# Patient Record
Sex: FEMALE | Race: WHITE | HISPANIC OR LATINO | URBAN - METROPOLITAN AREA
[De-identification: names, ages, dates, MRNs, and addresses within clinical notes are randomized per-mention and may not be internally consistent; named-entity substitution may affect disease eponyms.]

---

## 2024-02-20 ENCOUNTER — LAB REQUISITION (OUTPATIENT)
Dept: LAB | Facility: HOSPITAL | Age: 29
End: 2024-02-20
Payer: COMMERCIAL

## 2024-02-20 DIAGNOSIS — Z34.90 ENCOUNTER FOR SUPERVISION OF NORMAL PREGNANCY, UNSPECIFIED, UNSPECIFIED TRIMESTER (HHS-HCC): ICD-10-CM

## 2024-02-20 PROCEDURE — 87086 URINE CULTURE/COLONY COUNT: CPT

## 2024-02-20 PROCEDURE — 87800 DETECT AGNT MULT DNA DIREC: CPT

## 2024-02-22 LAB — BACTERIA UR CULT: NO GROWTH

## 2024-02-23 LAB
C TRACH RRNA SPEC QL NAA+PROBE: NEGATIVE
N GONORRHOEA DNA SPEC QL PROBE+SIG AMP: NEGATIVE

## 2024-03-14 ENCOUNTER — LAB (OUTPATIENT)
Dept: LAB | Facility: LAB | Age: 29
End: 2024-03-14
Payer: COMMERCIAL

## 2024-03-14 DIAGNOSIS — Z34.90 ENCOUNTER FOR SUPERVISION OF NORMAL PREGNANCY, UNSPECIFIED, UNSPECIFIED TRIMESTER (HHS-HCC): Primary | ICD-10-CM

## 2024-03-14 LAB
ABO GROUP (TYPE) IN BLOOD: NORMAL
ANTIBODY SCREEN: NORMAL
ERYTHROCYTE [DISTWIDTH] IN BLOOD BY AUTOMATED COUNT: 13.1 % (ref 11.5–14.5)
HCT VFR BLD AUTO: 37.9 % (ref 36–46)
HGB BLD-MCNC: 13.3 G/DL (ref 12–16)
MCH RBC QN AUTO: 32 PG (ref 26–34)
MCHC RBC AUTO-ENTMCNC: 35.1 G/DL (ref 32–36)
MCV RBC AUTO: 91 FL (ref 80–100)
NRBC BLD-RTO: 0 /100 WBCS (ref 0–0)
PLATELET # BLD AUTO: 306 X10*3/UL (ref 150–450)
RBC # BLD AUTO: 4.16 X10*6/UL (ref 4–5.2)
RH FACTOR (ANTIGEN D): NORMAL
WBC # BLD AUTO: 8.7 X10*3/UL (ref 4.4–11.3)

## 2024-03-14 PROCEDURE — 87389 HIV-1 AG W/HIV-1&-2 AB AG IA: CPT

## 2024-03-14 PROCEDURE — 36415 COLL VENOUS BLD VENIPUNCTURE: CPT

## 2024-03-14 PROCEDURE — 86900 BLOOD TYPING SEROLOGIC ABO: CPT

## 2024-03-14 PROCEDURE — 86850 RBC ANTIBODY SCREEN: CPT

## 2024-03-14 PROCEDURE — 86803 HEPATITIS C AB TEST: CPT

## 2024-03-14 PROCEDURE — 87340 HEPATITIS B SURFACE AG IA: CPT

## 2024-03-14 PROCEDURE — 86317 IMMUNOASSAY INFECTIOUS AGENT: CPT

## 2024-03-14 PROCEDURE — 86780 TREPONEMA PALLIDUM: CPT

## 2024-03-14 PROCEDURE — 85027 COMPLETE CBC AUTOMATED: CPT

## 2024-03-14 PROCEDURE — 86695 HERPES SIMPLEX TYPE 1 TEST: CPT

## 2024-03-14 PROCEDURE — 86901 BLOOD TYPING SEROLOGIC RH(D): CPT

## 2024-03-14 PROCEDURE — 83036 HEMOGLOBIN GLYCOSYLATED A1C: CPT

## 2024-03-14 PROCEDURE — 86696 HERPES SIMPLEX TYPE 2 TEST: CPT

## 2024-03-15 LAB
EST. AVERAGE GLUCOSE BLD GHB EST-MCNC: 100 MG/DL
HBA1C MFR BLD: 5.1 %
HBV SURFACE AG SERPL QL IA: NONREACTIVE
HCV AB SER QL: NONREACTIVE
HERPES SIMPLEX VIRUS 1 IGG: <0.2 INDEX
HERPES SIMPLEX VIRUS 2 IGG: <0.2 INDEX
HIV 1+2 AB+HIV1 P24 AG SERPL QL IA: NONREACTIVE
REFLEX ADDED, ANEMIA PANEL: NORMAL
RUBV IGG SERPL IA-ACNC: 3.1 IA
RUBV IGG SERPL QL IA: POSITIVE
TREPONEMA PALLIDUM IGG+IGM AB [PRESENCE] IN SERUM OR PLASMA BY IMMUNOASSAY: NONREACTIVE

## 2024-05-07 ENCOUNTER — HOSPITAL ENCOUNTER (OUTPATIENT)
Dept: RADIOLOGY | Facility: CLINIC | Age: 29
Discharge: HOME | End: 2024-05-07
Payer: COMMERCIAL

## 2024-05-07 DIAGNOSIS — Z36.89 ENCOUNTER FOR FETAL ANATOMIC SURVEY (HHS-HCC): ICD-10-CM

## 2024-05-07 PROCEDURE — 76805 OB US >/= 14 WKS SNGL FETUS: CPT | Performed by: OBSTETRICS & GYNECOLOGY

## 2024-05-07 PROCEDURE — 76805 OB US >/= 14 WKS SNGL FETUS: CPT

## 2024-06-12 ENCOUNTER — LAB (OUTPATIENT)
Dept: LAB | Facility: LAB | Age: 29
End: 2024-06-12
Payer: COMMERCIAL

## 2024-06-12 DIAGNOSIS — Z34.90 ENCOUNTER FOR SUPERVISION OF NORMAL PREGNANCY, UNSPECIFIED, UNSPECIFIED TRIMESTER (HHS-HCC): Primary | ICD-10-CM

## 2024-06-12 LAB
ERYTHROCYTE [DISTWIDTH] IN BLOOD BY AUTOMATED COUNT: 12.9 % (ref 11.5–14.5)
GLUCOSE 1H P 50 G GLC PO SERPL-MCNC: 88 MG/DL
HCT VFR BLD AUTO: 35.4 % (ref 36–46)
HGB BLD-MCNC: 11.4 G/DL (ref 12–16)
MCH RBC QN AUTO: 30.6 PG (ref 26–34)
MCHC RBC AUTO-ENTMCNC: 32.2 G/DL (ref 32–36)
MCV RBC AUTO: 95 FL (ref 80–100)
NRBC BLD-RTO: 0 /100 WBCS (ref 0–0)
PLATELET # BLD AUTO: 278 X10*3/UL (ref 150–450)
RBC # BLD AUTO: 3.72 X10*6/UL (ref 4–5.2)
REFLEX ADDED, ANEMIA PANEL: NORMAL
WBC # BLD AUTO: 8.1 X10*3/UL (ref 4.4–11.3)

## 2024-06-12 PROCEDURE — 85027 COMPLETE CBC AUTOMATED: CPT

## 2024-06-12 PROCEDURE — 36415 COLL VENOUS BLD VENIPUNCTURE: CPT

## 2024-06-12 PROCEDURE — 82947 ASSAY GLUCOSE BLOOD QUANT: CPT

## 2024-07-29 ENCOUNTER — HOSPITAL ENCOUNTER (OUTPATIENT)
Dept: RADIOLOGY | Facility: CLINIC | Age: 29
Discharge: HOME | End: 2024-07-29
Payer: COMMERCIAL

## 2024-07-29 DIAGNOSIS — Z36.89 ENCOUNTER FOR FETAL ANATOMIC SURVEY (HHS-HCC): ICD-10-CM

## 2024-07-29 PROCEDURE — 76816 OB US FOLLOW-UP PER FETUS: CPT

## 2024-07-29 PROCEDURE — 76816 OB US FOLLOW-UP PER FETUS: CPT | Performed by: OBSTETRICS & GYNECOLOGY

## 2024-08-12 ENCOUNTER — HOSPITAL ENCOUNTER (OUTPATIENT)
Facility: HOSPITAL | Age: 29
Discharge: HOME | End: 2024-08-12
Attending: OBSTETRICS & GYNECOLOGY | Admitting: OBSTETRICS & GYNECOLOGY
Payer: COMMERCIAL

## 2024-08-12 VITALS
OXYGEN SATURATION: 100 % | RESPIRATION RATE: 16 BRPM | HEIGHT: 69 IN | BODY MASS INDEX: 30.81 KG/M2 | SYSTOLIC BLOOD PRESSURE: 126 MMHG | TEMPERATURE: 97.9 F | DIASTOLIC BLOOD PRESSURE: 76 MMHG | WEIGHT: 208 LBS | HEART RATE: 85 BPM

## 2024-08-12 DIAGNOSIS — O99.013 ANEMIA IN PREGNANCY, THIRD TRIMESTER (HHS-HCC): Primary | ICD-10-CM

## 2024-08-12 PROBLEM — Z3A.35 35 WEEKS GESTATION OF PREGNANCY (HHS-HCC): Status: ACTIVE | Noted: 2024-08-12

## 2024-08-12 LAB
ABO GROUP (TYPE) IN BLOOD: NORMAL
ALBUMIN SERPL BCP-MCNC: 3.6 G/DL (ref 3.4–5)
ALP SERPL-CCNC: 145 U/L (ref 33–110)
ALT SERPL W P-5'-P-CCNC: 8 U/L (ref 7–45)
ANION GAP SERPL CALC-SCNC: 14 MMOL/L (ref 10–20)
ANTIBODY SCREEN: NORMAL
AST SERPL W P-5'-P-CCNC: 14 U/L (ref 9–39)
BILIRUB SERPL-MCNC: 0.4 MG/DL (ref 0–1.2)
BUN SERPL-MCNC: 7 MG/DL (ref 6–23)
CALCIUM SERPL-MCNC: 8.5 MG/DL (ref 8.6–10.3)
CHLORIDE SERPL-SCNC: 105 MMOL/L (ref 98–107)
CO2 SERPL-SCNC: 20 MMOL/L (ref 21–32)
CREAT SERPL-MCNC: 0.57 MG/DL (ref 0.5–1.05)
CREAT UR-MCNC: 23.5 MG/DL (ref 20–320)
EGFRCR SERPLBLD CKD-EPI 2021: >90 ML/MIN/1.73M*2
ERYTHROCYTE [DISTWIDTH] IN BLOOD BY AUTOMATED COUNT: 13 % (ref 11.5–14.5)
GLUCOSE SERPL-MCNC: 73 MG/DL (ref 74–99)
HCT VFR BLD AUTO: 33.3 % (ref 36–46)
HGB BLD-MCNC: 10.9 G/DL (ref 12–16)
LDH SERPL L TO P-CCNC: 185 U/L (ref 84–246)
MCH RBC QN AUTO: 28.5 PG (ref 26–34)
MCHC RBC AUTO-ENTMCNC: 32.7 G/DL (ref 32–36)
MCV RBC AUTO: 87 FL (ref 80–100)
NRBC BLD-RTO: 0 /100 WBCS (ref 0–0)
PLATELET # BLD AUTO: 277 X10*3/UL (ref 150–450)
POTASSIUM SERPL-SCNC: 4 MMOL/L (ref 3.5–5.3)
PROT SERPL-MCNC: 6.4 G/DL (ref 6.4–8.2)
PROT UR-ACNC: 4 MG/DL (ref 5–24)
PROT/CREAT UR: 0.17 MG/MG CREAT (ref 0–0.17)
RBC # BLD AUTO: 3.82 X10*6/UL (ref 4–5.2)
RH FACTOR (ANTIGEN D): NORMAL
SODIUM SERPL-SCNC: 135 MMOL/L (ref 136–145)
URATE SERPL-MCNC: 3.3 MG/DL (ref 2.3–6.7)
WBC # BLD AUTO: 10.3 X10*3/UL (ref 4.4–11.3)

## 2024-08-12 PROCEDURE — 36415 COLL VENOUS BLD VENIPUNCTURE: CPT | Performed by: OBSTETRICS & GYNECOLOGY

## 2024-08-12 PROCEDURE — 59025 FETAL NON-STRESS TEST: CPT | Performed by: OBSTETRICS & GYNECOLOGY

## 2024-08-12 PROCEDURE — 87081 CULTURE SCREEN ONLY: CPT | Mod: GEALAB | Performed by: OBSTETRICS & GYNECOLOGY

## 2024-08-12 PROCEDURE — 82570 ASSAY OF URINE CREATININE: CPT | Performed by: OBSTETRICS & GYNECOLOGY

## 2024-08-12 PROCEDURE — 99234 HOSP IP/OBS SM DT SF/LOW 45: CPT | Performed by: OBSTETRICS & GYNECOLOGY

## 2024-08-12 PROCEDURE — 84550 ASSAY OF BLOOD/URIC ACID: CPT | Performed by: OBSTETRICS & GYNECOLOGY

## 2024-08-12 PROCEDURE — 86901 BLOOD TYPING SEROLOGIC RH(D): CPT | Performed by: OBSTETRICS & GYNECOLOGY

## 2024-08-12 PROCEDURE — 83615 LACTATE (LD) (LDH) ENZYME: CPT | Performed by: OBSTETRICS & GYNECOLOGY

## 2024-08-12 PROCEDURE — 82040 ASSAY OF SERUM ALBUMIN: CPT | Performed by: OBSTETRICS & GYNECOLOGY

## 2024-08-12 PROCEDURE — 85027 COMPLETE CBC AUTOMATED: CPT | Performed by: OBSTETRICS & GYNECOLOGY

## 2024-08-12 RX ORDER — OMEPRAZOLE 40 MG/1
40 CAPSULE, DELAYED RELEASE ORAL
Status: ON HOLD | COMMUNITY

## 2024-08-12 RX ORDER — FERROUS SULFATE 325(65) MG
325 TABLET ORAL
Status: ON HOLD | COMMUNITY
Start: 2024-08-12

## 2024-08-12 RX ORDER — ONDANSETRON HYDROCHLORIDE 2 MG/ML
4 INJECTION, SOLUTION INTRAVENOUS EVERY 6 HOURS PRN
Status: DISCONTINUED | OUTPATIENT
Start: 2024-08-12 | End: 2024-08-12 | Stop reason: HOSPADM

## 2024-08-12 RX ORDER — LIDOCAINE HYDROCHLORIDE 10 MG/ML
0.5 INJECTION, SOLUTION EPIDURAL; INFILTRATION; INTRACAUDAL; PERINEURAL ONCE AS NEEDED
Status: DISCONTINUED | OUTPATIENT
Start: 2024-08-12 | End: 2024-08-12 | Stop reason: HOSPADM

## 2024-08-12 RX ORDER — HYDRALAZINE HYDROCHLORIDE 20 MG/ML
5 INJECTION INTRAMUSCULAR; INTRAVENOUS ONCE AS NEEDED
Status: DISCONTINUED | OUTPATIENT
Start: 2024-08-12 | End: 2024-08-12 | Stop reason: HOSPADM

## 2024-08-12 RX ORDER — LABETALOL HYDROCHLORIDE 5 MG/ML
20 INJECTION, SOLUTION INTRAVENOUS ONCE AS NEEDED
Status: DISCONTINUED | OUTPATIENT
Start: 2024-08-12 | End: 2024-08-12 | Stop reason: HOSPADM

## 2024-08-12 RX ORDER — ONDANSETRON 4 MG/1
4 TABLET, FILM COATED ORAL EVERY 6 HOURS PRN
Status: DISCONTINUED | OUTPATIENT
Start: 2024-08-12 | End: 2024-08-12 | Stop reason: HOSPADM

## 2024-08-12 RX ORDER — NIFEDIPINE 10 MG/1
10 CAPSULE ORAL ONCE AS NEEDED
Status: DISCONTINUED | OUTPATIENT
Start: 2024-08-12 | End: 2024-08-12 | Stop reason: HOSPADM

## 2024-08-12 ASSESSMENT — PAIN SCALES - GENERAL: PAINLEVEL_OUTOF10: 1

## 2024-08-12 NOTE — PROGRESS NOTES
Feeling some cramping still, but not as much.   Baby is moving well.  No further gush of fluids.  No headaches or vision changes.     Other than initial mild range BP all others have been normal.   HELLP labs and p/c ratio are normal.    Cervix rechecked and unchanged, still 1/70/-3  FHR: Category 1, moderate variability  Contractions: irritability.    Ok for discharge home.  Advised her to return for changes.  She is to keep her scheduled appointment in the office tomorrow.

## 2024-08-12 NOTE — H&P
Obstetrical History and Physical     Kristen Hosler is a 29 y.o.     Chief Complaint: Leakage/Loss of Fluid (11am yesterday-gush  some last night- not much today/Nothing on panty liner)    Assessment/Plan    30 yo  at 35.2 weeks with gush of fluid x 1, mild range BP x 1, cramping    - FHR Category 1, moderate variability, continue monitoring.  - Exam is negative for rupture of membranes.   - She is 70/-3, having some irregular contractions. Will re-evaluate cervical exam in 1-2 hours.  - First BP was mild range, second normal. She has not had any elevated BP in the office. Will check labs: CBC, CMP, T&S, uric acid, LDH, and urine p/c ratio.  - GBS swab done.      Active Problems:  There are no active Hospital Problems.      Pregnancy Problems (from 24 to present)       No problems associated with this episode.          Subjective   Radha is here complaining of a gush of fluid that happened yesterday morning around 11 AM. Happened one time, clear. Since then some increased discharge that is thin, wearing liner. No further gushing fluid. No bleeding.   Feeling some cramping off and on.  Baby is moving, but was less than usual.       Obstetrical History   OB History    Para Term  AB Living   2       1     SAB IAB Ectopic Multiple Live Births   1              # Outcome Date GA Lbr Raul/2nd Weight Sex Type Anes PTL Lv   2 Current            1 2021               Past Medical History  No past medical history on file.     Past Surgical History   No past surgical history on file.    Social History  Social History     Tobacco Use    Smoking status: Not on file    Smokeless tobacco: Not on file   Substance Use Topics    Alcohol use: Not on file     Substance and Sexual Activity   Drug Use Not on file       Allergies  Patient has no known allergies.     Medications  Medications Prior to Admission   Medication Sig Dispense Refill Last Dose    omeprazole (PriLOSEC) 40 mg DR capsule Take 1  capsule (40 mg) by mouth. Do not crush or chew.   8/11/2024 at 2200       Objective    Last Vitals  Temp Pulse Resp BP MAP O2 Sat   36.6 °C (97.9 °F) 86 16 121/68 87 100 %     Physical Examination  GENERAL: Examination reveals a well developed, well nourished, gravid female in no acute distress. She is alert and cooperative.  LUNGS:  Normal respiratory effort  ABDOMEN: soft, gravid, nontender, nondistended, no abnormal masses, no epigastric pain  FHR is moderate variability, baseline 120 , with Accelerations, and a Category I tracing.    [place] reading:  irregular  The fetus is in a vertex presentation, determined by vaginal exam  VAGINA:  Thin, white discharge. Negative nitrazine  CERVIX: 1  cm dilated, 70  % effaced,  -3 station; MEMBRANES are  intact  SKIN: normal coloration and turgor, no rashes  PSYCHOLOGICAL: awake and alert; oriented to person, place, and time    Lab Review  Labs in chart were reviewed.

## 2024-08-12 NOTE — DISCHARGE SUMMARY
Discharge Summary    Admission Date: 8/12/2024  Discharge Date: 8/12/2024    Discharge Diagnosis  35.2 weeks, isolated mild range BP, irregular contractions    Hospital Course    Patient presented for rule out SROM after a gush of fluid yesterday.  Exam negative for rupture of membranes. Irregular contractions noted, stayed 1 cm dilated.  Initial BP mild range, all others normal. HELLP labs normal.   Noted to be anemic. Advised to start an iron supplement.  Discharged home with precautions. She is to keep her scheduled office appointment tomorrow.    Last Vitals:  Temp Pulse Resp BP MAP Pulse Ox   36.6 °C (97.9 °F) 85 16 126/76 94 100 %     Discharge Meds     Your medication list        START taking these medications        Instructions Last Dose Given Next Dose Due   ferrous sulfate (325 mg ferrous sulfate) tablet      Take 1 tablet by mouth once daily with breakfast.              CONTINUE taking these medications        Instructions Last Dose Given Next Dose Due   omeprazole 40 mg DR capsule  Commonly known as: PriLOSEC                     Where to Get Your Medications        You can get these medications from any pharmacy    You don't need a prescription for these medications  ferrous sulfate (325 mg ferrous sulfate) tablet          Complications Requiring Follow-Up  Mild range BP    Test Results Pending At Discharge  Pending Labs       Order Current Status    Group B Streptococcus (GBS) Prenatal Screen, Culture In process            Outpatient Follow-Up  Office appointment in 1 day    Karine Mcdoonugh MD

## 2024-08-12 NOTE — PROCEDURES
Kristen Hosler, a  at 35w2d with an RACHNA of 2024, by Last Menstrual Period, was seen at [facility] for a nonstress test.    Non-Stress Test   Baseline Fetal Heart Rate for Non-Stress Test: 145 BPM  Variability in Waveform for Non-Stress Test: Moderate  Accelerations in Non-Stress Test: Yes, greater than/equal to 15 bpm, lasting at least 15 seconds  Decelerations in Non-Stress Test: None  Contractions in Non-Stress Test: Irregular  NST Contraction Frequency: irritability  Acoustic Stimulator for Non-Stress Test: No  Interpretation of Non-Stress Test   Interpretation of Non-Stress Test: Reactive

## 2024-08-15 LAB — GP B STREP GENITAL QL CULT: NORMAL

## 2024-08-17 ENCOUNTER — HOSPITAL ENCOUNTER (INPATIENT)
Facility: HOSPITAL | Age: 29
LOS: 3 days | Discharge: HOME | End: 2024-08-20
Attending: OBSTETRICS & GYNECOLOGY | Admitting: OBSTETRICS & GYNECOLOGY
Payer: COMMERCIAL

## 2024-08-17 LAB
ABO GROUP (TYPE) IN BLOOD: NORMAL
ANTIBODY SCREEN: NORMAL
ERYTHROCYTE [DISTWIDTH] IN BLOOD BY AUTOMATED COUNT: 13.1 % (ref 11.5–14.5)
HCT VFR BLD AUTO: 30.9 % (ref 36–46)
HGB BLD-MCNC: 10.3 G/DL (ref 12–16)
MCH RBC QN AUTO: 28.3 PG (ref 26–34)
MCHC RBC AUTO-ENTMCNC: 33.3 G/DL (ref 32–36)
MCV RBC AUTO: 85 FL (ref 80–100)
NRBC BLD-RTO: 0 /100 WBCS (ref 0–0)
PLATELET # BLD AUTO: 266 X10*3/UL (ref 150–450)
RBC # BLD AUTO: 3.64 X10*6/UL (ref 4–5.2)
RH FACTOR (ANTIGEN D): NORMAL
WBC # BLD AUTO: 13.9 X10*3/UL (ref 4.4–11.3)

## 2024-08-17 PROCEDURE — 1120000001 HC OB PRIVATE ROOM DAILY

## 2024-08-17 PROCEDURE — 85027 COMPLETE CBC AUTOMATED: CPT | Performed by: OBSTETRICS & GYNECOLOGY

## 2024-08-17 PROCEDURE — 86780 TREPONEMA PALLIDUM: CPT | Mod: GEALAB | Performed by: OBSTETRICS & GYNECOLOGY

## 2024-08-17 PROCEDURE — 86901 BLOOD TYPING SEROLOGIC RH(D): CPT | Performed by: OBSTETRICS & GYNECOLOGY

## 2024-08-17 PROCEDURE — 2500000001 HC RX 250 WO HCPCS SELF ADMINISTERED DRUGS (ALT 637 FOR MEDICARE OP): Performed by: OBSTETRICS & GYNECOLOGY

## 2024-08-17 PROCEDURE — 2500000004 HC RX 250 GENERAL PHARMACY W/ HCPCS (ALT 636 FOR OP/ED): Performed by: OBSTETRICS & GYNECOLOGY

## 2024-08-17 PROCEDURE — 94760 N-INVAS EAR/PLS OXIMETRY 1: CPT

## 2024-08-17 PROCEDURE — 36415 COLL VENOUS BLD VENIPUNCTURE: CPT | Performed by: OBSTETRICS & GYNECOLOGY

## 2024-08-17 RX ORDER — METOCLOPRAMIDE HYDROCHLORIDE 5 MG/ML
10 INJECTION INTRAMUSCULAR; INTRAVENOUS EVERY 6 HOURS PRN
Status: DISCONTINUED | OUTPATIENT
Start: 2024-08-17 | End: 2024-08-18

## 2024-08-17 RX ORDER — MISOPROSTOL 200 UG/1
800 TABLET ORAL ONCE AS NEEDED
Status: DISCONTINUED | OUTPATIENT
Start: 2024-08-17 | End: 2024-08-18

## 2024-08-17 RX ORDER — NALBUPHINE HYDROCHLORIDE 10 MG/ML
10 INJECTION, SOLUTION INTRAMUSCULAR; INTRAVENOUS; SUBCUTANEOUS
Status: DISCONTINUED | OUTPATIENT
Start: 2024-08-17 | End: 2024-08-18

## 2024-08-17 RX ORDER — OXYTOCIN 10 [USP'U]/ML
10 INJECTION, SOLUTION INTRAMUSCULAR; INTRAVENOUS ONCE AS NEEDED
Status: DISCONTINUED | OUTPATIENT
Start: 2024-08-17 | End: 2024-08-18

## 2024-08-17 RX ORDER — METOCLOPRAMIDE 10 MG/1
10 TABLET ORAL EVERY 6 HOURS PRN
Status: DISCONTINUED | OUTPATIENT
Start: 2024-08-17 | End: 2024-08-18

## 2024-08-17 RX ORDER — BETAMETHASONE SODIUM PHOSPHATE AND BETAMETHASONE ACETATE 3; 3 MG/ML; MG/ML
12 INJECTION, SUSPENSION INTRA-ARTICULAR; INTRALESIONAL; INTRAMUSCULAR; SOFT TISSUE ONCE
Status: COMPLETED | OUTPATIENT
Start: 2024-08-17 | End: 2024-08-17

## 2024-08-17 RX ORDER — ONDANSETRON 4 MG/1
4 TABLET, FILM COATED ORAL EVERY 6 HOURS PRN
Status: DISCONTINUED | OUTPATIENT
Start: 2024-08-17 | End: 2024-08-18

## 2024-08-17 RX ORDER — OXYTOCIN/0.9 % SODIUM CHLORIDE 30/500 ML
2-30 PLASTIC BAG, INJECTION (ML) INTRAVENOUS CONTINUOUS
Status: DISCONTINUED | OUTPATIENT
Start: 2024-08-17 | End: 2024-08-20 | Stop reason: HOSPADM

## 2024-08-17 RX ORDER — FAMOTIDINE 20 MG/1
40 TABLET, FILM COATED ORAL DAILY
Status: DISCONTINUED | OUTPATIENT
Start: 2024-08-17 | End: 2024-08-18

## 2024-08-17 RX ORDER — LIDOCAINE HYDROCHLORIDE 10 MG/ML
30 INJECTION INFILTRATION; PERINEURAL ONCE AS NEEDED
Status: DISCONTINUED | OUTPATIENT
Start: 2024-08-17 | End: 2024-08-18

## 2024-08-17 RX ORDER — LOPERAMIDE HYDROCHLORIDE 2 MG/1
4 CAPSULE ORAL EVERY 2 HOUR PRN
Status: DISCONTINUED | OUTPATIENT
Start: 2024-08-17 | End: 2024-08-18

## 2024-08-17 RX ORDER — ONDANSETRON HYDROCHLORIDE 2 MG/ML
4 INJECTION, SOLUTION INTRAVENOUS EVERY 6 HOURS PRN
Status: DISCONTINUED | OUTPATIENT
Start: 2024-08-17 | End: 2024-08-18

## 2024-08-17 RX ORDER — OXYTOCIN/0.9 % SODIUM CHLORIDE 30/500 ML
60 PLASTIC BAG, INJECTION (ML) INTRAVENOUS ONCE AS NEEDED
Status: DISCONTINUED | OUTPATIENT
Start: 2024-08-17 | End: 2024-08-18

## 2024-08-17 RX ORDER — METHYLERGONOVINE MALEATE 0.2 MG/ML
0.2 INJECTION INTRAVENOUS ONCE AS NEEDED
Status: DISCONTINUED | OUTPATIENT
Start: 2024-08-17 | End: 2024-08-18

## 2024-08-17 RX ORDER — CARBOPROST TROMETHAMINE 250 UG/ML
250 INJECTION, SOLUTION INTRAMUSCULAR ONCE AS NEEDED
Status: DISCONTINUED | OUTPATIENT
Start: 2024-08-17 | End: 2024-08-18

## 2024-08-17 RX ORDER — NIFEDIPINE 10 MG/1
10 CAPSULE ORAL ONCE AS NEEDED
Status: DISCONTINUED | OUTPATIENT
Start: 2024-08-17 | End: 2024-08-18

## 2024-08-17 RX ORDER — HYDRALAZINE HYDROCHLORIDE 20 MG/ML
5 INJECTION INTRAMUSCULAR; INTRAVENOUS ONCE AS NEEDED
Status: DISCONTINUED | OUTPATIENT
Start: 2024-08-17 | End: 2024-08-18

## 2024-08-17 RX ORDER — TERBUTALINE SULFATE 1 MG/ML
0.25 INJECTION SUBCUTANEOUS ONCE AS NEEDED
Status: DISCONTINUED | OUTPATIENT
Start: 2024-08-17 | End: 2024-08-18

## 2024-08-17 RX ORDER — SODIUM CHLORIDE, SODIUM LACTATE, POTASSIUM CHLORIDE, CALCIUM CHLORIDE 600; 310; 30; 20 MG/100ML; MG/100ML; MG/100ML; MG/100ML
125 INJECTION, SOLUTION INTRAVENOUS CONTINUOUS
Status: DISCONTINUED | OUTPATIENT
Start: 2024-08-17 | End: 2024-08-18

## 2024-08-17 RX ORDER — LABETALOL HYDROCHLORIDE 5 MG/ML
20 INJECTION, SOLUTION INTRAVENOUS ONCE AS NEEDED
Status: DISCONTINUED | OUTPATIENT
Start: 2024-08-17 | End: 2024-08-18

## 2024-08-17 SDOH — SOCIAL STABILITY: SOCIAL INSECURITY: DOES ANYONE TRY TO KEEP YOU FROM HAVING/CONTACTING OTHER FRIENDS OR DOING THINGS OUTSIDE YOUR HOME?: NO

## 2024-08-17 SDOH — HEALTH STABILITY: MENTAL HEALTH: HAVE YOU USED ANY SUBSTANCES (CANABIS, COCAINE, HEROIN, HALLUCINOGENS, INHALANTS, ETC.) IN THE PAST 12 MONTHS?: NO

## 2024-08-17 SDOH — SOCIAL STABILITY: SOCIAL INSECURITY: VERBAL ABUSE: DENIES

## 2024-08-17 SDOH — SOCIAL STABILITY: SOCIAL INSECURITY: PHYSICAL ABUSE: DENIES

## 2024-08-17 SDOH — SOCIAL STABILITY: SOCIAL INSECURITY: ARE THERE ANY APPARENT SIGNS OF INJURIES/BEHAVIORS THAT COULD BE RELATED TO ABUSE/NEGLECT?: NO

## 2024-08-17 SDOH — SOCIAL STABILITY: SOCIAL INSECURITY: HAVE YOU HAD THOUGHTS OF HARMING ANYONE ELSE?: NO

## 2024-08-17 SDOH — SOCIAL STABILITY: SOCIAL INSECURITY: HAS ANYONE EVER THREATENED TO HURT YOUR FAMILY OR YOUR PETS?: NO

## 2024-08-17 SDOH — HEALTH STABILITY: MENTAL HEALTH: HAVE YOU USED ANY PRESCRIPTION DRUGS OTHER THAN PRESCRIBED IN THE PAST 12 MONTHS?: NO

## 2024-08-17 SDOH — HEALTH STABILITY: MENTAL HEALTH: WISH TO BE DEAD (PAST 1 MONTH): NO

## 2024-08-17 SDOH — SOCIAL STABILITY: SOCIAL INSECURITY: DO YOU FEEL ANYONE HAS EXPLOITED OR TAKEN ADVANTAGE OF YOU FINANCIALLY OR OF YOUR PERSONAL PROPERTY?: NO

## 2024-08-17 SDOH — HEALTH STABILITY: MENTAL HEALTH: SUICIDAL BEHAVIOR (LIFETIME): NO

## 2024-08-17 SDOH — HEALTH STABILITY: MENTAL HEALTH: WERE YOU ABLE TO COMPLETE ALL THE BEHAVIORAL HEALTH SCREENINGS?: YES

## 2024-08-17 SDOH — SOCIAL STABILITY: SOCIAL INSECURITY: ABUSE SCREEN: ADULT

## 2024-08-17 SDOH — HEALTH STABILITY: MENTAL HEALTH: NON-SPECIFIC ACTIVE SUICIDAL THOUGHTS (PAST 1 MONTH): NO

## 2024-08-17 SDOH — SOCIAL STABILITY: SOCIAL INSECURITY: HAVE YOU HAD ANY THOUGHTS OF HARMING ANYONE ELSE?: NO

## 2024-08-17 SDOH — SOCIAL STABILITY: SOCIAL INSECURITY: ARE YOU OR HAVE YOU BEEN THREATENED OR ABUSED PHYSICALLY, EMOTIONALLY, OR SEXUALLY BY ANYONE?: NO

## 2024-08-17 SDOH — ECONOMIC STABILITY: HOUSING INSECURITY: DO YOU FEEL UNSAFE GOING BACK TO THE PLACE WHERE YOU ARE LIVING?: NO

## 2024-08-17 ASSESSMENT — PATIENT HEALTH QUESTIONNAIRE - PHQ9
1. LITTLE INTEREST OR PLEASURE IN DOING THINGS: NOT AT ALL
2. FEELING DOWN, DEPRESSED OR HOPELESS: NOT AT ALL
SUM OF ALL RESPONSES TO PHQ9 QUESTIONS 1 & 2: 0

## 2024-08-17 ASSESSMENT — ACTIVITIES OF DAILY LIVING (ADL): LACK_OF_TRANSPORTATION: NO

## 2024-08-17 ASSESSMENT — PAIN SCALES - GENERAL
PAINLEVEL_OUTOF10: 0 - NO PAIN
PAINLEVEL_OUTOF10: 4
PAINLEVEL_OUTOF10: 0 - NO PAIN

## 2024-08-17 ASSESSMENT — LIFESTYLE VARIABLES
AUDIT-C TOTAL SCORE: 0
HOW OFTEN DO YOU HAVE A DRINK CONTAINING ALCOHOL: NEVER
HOW OFTEN DO YOU HAVE 6 OR MORE DRINKS ON ONE OCCASION: NEVER
HOW MANY STANDARD DRINKS CONTAINING ALCOHOL DO YOU HAVE ON A TYPICAL DAY: PATIENT DOES NOT DRINK
SKIP TO QUESTIONS 9-10: 1
AUDIT-C TOTAL SCORE: 0

## 2024-08-17 NOTE — PROGRESS NOTES
Q  2-4   min  ctxs  fhts  cat  1  afeb   cx 6/90/-1  reviewed   incr   risk  of   inf  with   prolonged  rom  ,  agrees   to  pitocin   if   ctxs  space   out

## 2024-08-17 NOTE — H&P
Obstetrical Admission History and Physical     Kristen Hosler is a 29 y.o.  at 36w0d. RACHNA: 2024, by Last Menstrual Period. Estimated fetal weight: 3000gms. She has had prenatal care with [facility] .    Chief Complaint: No chief complaint on file.28 yo  36 weeks  with  leaking   fluid since 10 pm  q 7  min   ctxs   prenatal   neg  aller neg   meds   pepcid pmh  gerd  psh  d and c  smoke  etoh  drugs  neg    gbs  neg      Assessment/Plan     36  weeks  pprom plan   expectant  management   , if   no  progress   in  labor  next   few   hrs   rec   pitocin  ,  steroids       Assessment & Plan        Pregnancy Problems (from 24 to present)       Problem Noted Resolved    35 weeks gestation of pregnancy ([facility]) 2024 by Karine Mcdonough MD No    Priority:  Medium            Options for delivery have been discussed with the patient and she elects a vaginal delivery.  Labor has been discussed in detail. The risks, benefits, complications, alternatives, expected outcomes, potential problems during recuperation and recovery, and the risks of not performing the procedure were discussed with the patient. The patient stated understanding that the risks of delivery include, but are not limited to: death; reaction to medications; injury to bowel, bladder, ureters, uterus, cervix, vagina, and other pelvic and abdominal structures, infection; blood loss and possible need for transfusion; and potential need for surgery, including hysterectomy. The risks of injury to the infant during delivery were also discussed. All questions were answered. The patient is wishing to continue with plans for a vaginal delivery.    Admit to inpatient status. I anticipate that this patient will require a stay exceeding at least 2 midnights for delivery and postpartum.  Active management of rupture of membranes.  Management of pregnancy complications, as indicated.    Fidencio Garcia presents to Labor & Delivery with  Spontaneous Rupture of Membranes of Clear fluid at 2200 hr on 24. Good fetal movement.          Obstetrical History   OB History    Para Term  AB Living   2       1     SAB IAB Ectopic Multiple Live Births   1              # Outcome Date GA Lbr Raul/2nd Weight Sex Type Anes PTL Lv   2 Current            1 SAB                Past Medical History  Past Medical History:   Diagnosis Date    Irregular periods 2016        Past Surgical History   No past surgical history on file.    Social History  Social History     Tobacco Use    Smoking status: Not on file    Smokeless tobacco: Not on file   Substance Use Topics    Alcohol use: Not on file     Substance and Sexual Activity   Drug Use Not on file       Allergies  Patient has no known allergies.     Medications  Medications Prior to Admission   Medication Sig Dispense Refill Last Dose    omeprazole (PriLOSEC) 40 mg DR capsule Take 1 capsule (40 mg) by mouth. Do not crush or chew.   2024    ferrous sulfate, 325 mg ferrous sulfate, tablet Take 1 tablet by mouth once daily with breakfast.          Objective    Last Vitals  Temp Pulse Resp BP MAP O2 Sat   36.7 °C (98.1 °F) (!) 113 18 122/74 90 98 %     Physical Examination  Heart  rrr    lungs   cta  abd  soft   n/t   sse   pooling   clear   fluid  ntz   pos   cx  3/90/-1/vtx  fhts  cat 1     Lab Review

## 2024-08-17 NOTE — PROGRESS NOTES
Q  3-6   min   ctxs     fhts  cat  1  cx   5/90/-1  enc  pitocin  for  better  ctx pattern  , refused   for   now

## 2024-08-17 NOTE — LACTATION NOTE
Lactation Consultant Note  Lactation Consultation  Reason for Consult: Initial assessment  Consultant Name: Chris Boyd    Maternal Information  Has mother  before?: No    Patient Follow-up  Inpatient Lactation Follow-up Needed : Yes    Other OB Lactation Documentation  Maternal Risk Factors:  delivery <37 weeks  Infant Risk Factors: Prematurity <37 weeks    Recommendations/Summary  29 year old  patient currently in labor. Met with parents for initial lactation consult to assess breastfeeding goals and to provide lactation support and education. Verbalizes goal of breastfeeding for as long as possible. Reports positive breast changes during pregnancy and denies history of breast or nipple surgery. Mother is a  and is unsure of when she will return to work. Has a personal breast pump for home/work use.     Breastfeeding handouts provided. Breastfeeding education and support provided throughout consult. Patient provided with the opportunity to ask questions. All questions answered. See education flow sheet for detailed list of education topics covered. Reviewed importance of frequent skin to skin contact, waking techniques, infant stomach capacity, value of colostrum feeds and typical  feeding behaviors in the first 24 hours. Encouraged frequent skin to skin and nursing with cues and at least 8 times in the first 24 hours. Reviewed 36 week infant education. Reviewed signs of adequate intake/output. Patient denies any further questions or concerns at this time. Offered ongoing breastfeeding assistance, support and education as needed and desired.

## 2024-08-18 ENCOUNTER — ANESTHESIA EVENT (OUTPATIENT)
Dept: OBSTETRICS AND GYNECOLOGY | Facility: HOSPITAL | Age: 29
End: 2024-08-18
Payer: COMMERCIAL

## 2024-08-18 ENCOUNTER — ANESTHESIA (OUTPATIENT)
Dept: OBSTETRICS AND GYNECOLOGY | Facility: HOSPITAL | Age: 29
End: 2024-08-18
Payer: COMMERCIAL

## 2024-08-18 PROBLEM — Z3A.35 35 WEEKS GESTATION OF PREGNANCY (HHS-HCC): Status: RESOLVED | Noted: 2024-08-12 | Resolved: 2024-08-18

## 2024-08-18 LAB — TREPONEMA PALLIDUM IGG+IGM AB [PRESENCE] IN SERUM OR PLASMA BY IMMUNOASSAY: NONREACTIVE

## 2024-08-18 PROCEDURE — 59514 CESAREAN DELIVERY ONLY: CPT | Performed by: OBSTETRICS & GYNECOLOGY

## 2024-08-18 PROCEDURE — 7100000016 HC LABOR RECOVERY PER HOUR

## 2024-08-18 PROCEDURE — 51702 INSERT TEMP BLADDER CATH: CPT

## 2024-08-18 PROCEDURE — 2500000004 HC RX 250 GENERAL PHARMACY W/ HCPCS (ALT 636 FOR OP/ED): Performed by: NURSE ANESTHETIST, CERTIFIED REGISTERED

## 2024-08-18 PROCEDURE — 99465 NB RESUSCITATION: CPT

## 2024-08-18 PROCEDURE — 1100000001 HC PRIVATE ROOM DAILY

## 2024-08-18 PROCEDURE — 3700000018 HC OB ANESTHESIA C-SECTION: Performed by: OBSTETRICS & GYNECOLOGY

## 2024-08-18 PROCEDURE — A6213 FOAM DRG >16<=48 SQ IN W/BDR: HCPCS | Performed by: OBSTETRICS & GYNECOLOGY

## 2024-08-18 PROCEDURE — 2500000004 HC RX 250 GENERAL PHARMACY W/ HCPCS (ALT 636 FOR OP/ED): Performed by: OBSTETRICS & GYNECOLOGY

## 2024-08-18 PROCEDURE — 7100000016 HC LABOR RECOVERY PER HOUR: Performed by: OBSTETRICS & GYNECOLOGY

## 2024-08-18 PROCEDURE — 2720000007 HC OR 272 NO HCPCS: Performed by: OBSTETRICS & GYNECOLOGY

## 2024-08-18 PROCEDURE — 2720000007 HC OR 272 NO HCPCS

## 2024-08-18 PROCEDURE — 59050 FETAL MONITOR W/REPORT: CPT

## 2024-08-18 PROCEDURE — 7210000002 HC LABOR PER HOUR

## 2024-08-18 RX ORDER — ACETAMINOPHEN 325 MG/1
975 TABLET ORAL EVERY 6 HOURS
Status: DISCONTINUED | OUTPATIENT
Start: 2024-08-18 | End: 2024-08-20 | Stop reason: HOSPADM

## 2024-08-18 RX ORDER — DIPHENHYDRAMINE HYDROCHLORIDE 50 MG/ML
25 INJECTION INTRAMUSCULAR; INTRAVENOUS EVERY 4 HOURS PRN
Status: DISCONTINUED | OUTPATIENT
Start: 2024-08-18 | End: 2024-08-20 | Stop reason: HOSPADM

## 2024-08-18 RX ORDER — KETOROLAC TROMETHAMINE 30 MG/ML
INJECTION, SOLUTION INTRAMUSCULAR; INTRAVENOUS AS NEEDED
Status: DISCONTINUED | OUTPATIENT
Start: 2024-08-18 | End: 2024-08-18

## 2024-08-18 RX ORDER — DIPHENHYDRAMINE HCL 25 MG
25 CAPSULE ORAL EVERY 4 HOURS PRN
Status: DISCONTINUED | OUTPATIENT
Start: 2024-08-18 | End: 2024-08-20 | Stop reason: HOSPADM

## 2024-08-18 RX ORDER — FERROUS SULFATE 325(65) MG
1 TABLET ORAL
Status: DISCONTINUED | OUTPATIENT
Start: 2024-08-18 | End: 2024-08-18

## 2024-08-18 RX ORDER — ADHESIVE BANDAGE
10 BANDAGE TOPICAL
Status: DISCONTINUED | OUTPATIENT
Start: 2024-08-18 | End: 2024-08-20 | Stop reason: HOSPADM

## 2024-08-18 RX ORDER — OXYTOCIN 10 [USP'U]/ML
10 INJECTION, SOLUTION INTRAMUSCULAR; INTRAVENOUS ONCE AS NEEDED
Status: DISCONTINUED | OUTPATIENT
Start: 2024-08-18 | End: 2024-08-20 | Stop reason: HOSPADM

## 2024-08-18 RX ORDER — AZITHROMYCIN 100 MG/ML
INJECTION, POWDER, LYOPHILIZED, FOR SOLUTION INTRAVENOUS AS NEEDED
Status: DISCONTINUED | OUTPATIENT
Start: 2024-08-18 | End: 2024-08-18

## 2024-08-18 RX ORDER — CEFAZOLIN SODIUM 2 G/100ML
INJECTION, SOLUTION INTRAVENOUS
Status: DISPENSED
Start: 2024-08-18 | End: 2024-08-18

## 2024-08-18 RX ORDER — TRANEXAMIC ACID 100 MG/ML
1000 INJECTION, SOLUTION INTRAVENOUS ONCE AS NEEDED
Status: DISCONTINUED | OUTPATIENT
Start: 2024-08-18 | End: 2024-08-20 | Stop reason: HOSPADM

## 2024-08-18 RX ORDER — ONDANSETRON 4 MG/1
4 TABLET, FILM COATED ORAL EVERY 6 HOURS PRN
Status: DISCONTINUED | OUTPATIENT
Start: 2024-08-18 | End: 2024-08-20 | Stop reason: HOSPADM

## 2024-08-18 RX ORDER — LOPERAMIDE HYDROCHLORIDE 2 MG/1
4 CAPSULE ORAL EVERY 2 HOUR PRN
Status: DISCONTINUED | OUTPATIENT
Start: 2024-08-18 | End: 2024-08-20 | Stop reason: HOSPADM

## 2024-08-18 RX ORDER — CEFAZOLIN 1 G/1
INJECTION, POWDER, FOR SOLUTION INTRAVENOUS AS NEEDED
Status: DISCONTINUED | OUTPATIENT
Start: 2024-08-18 | End: 2024-08-18

## 2024-08-18 RX ORDER — HYDROMORPHONE HYDROCHLORIDE 2 MG/ML
0.2 INJECTION, SOLUTION INTRAMUSCULAR; INTRAVENOUS; SUBCUTANEOUS EVERY 5 MIN PRN
Status: DISCONTINUED | OUTPATIENT
Start: 2024-08-18 | End: 2024-08-20 | Stop reason: HOSPADM

## 2024-08-18 RX ORDER — OXYCODONE HYDROCHLORIDE 5 MG/1
10 TABLET ORAL EVERY 4 HOURS PRN
Status: DISCONTINUED | OUTPATIENT
Start: 2024-08-19 | End: 2024-08-20 | Stop reason: HOSPADM

## 2024-08-18 RX ORDER — FAMOTIDINE 10 MG/ML
INJECTION INTRAVENOUS
Status: DISPENSED
Start: 2024-08-18 | End: 2024-08-18

## 2024-08-18 RX ORDER — POLYETHYLENE GLYCOL 3350 17 G/17G
17 POWDER, FOR SOLUTION ORAL 2 TIMES DAILY PRN
Status: DISCONTINUED | OUTPATIENT
Start: 2024-08-18 | End: 2024-08-20 | Stop reason: HOSPADM

## 2024-08-18 RX ORDER — METHYLERGONOVINE MALEATE 0.2 MG/ML
0.2 INJECTION INTRAVENOUS ONCE AS NEEDED
Status: DISCONTINUED | OUTPATIENT
Start: 2024-08-18 | End: 2024-08-20 | Stop reason: HOSPADM

## 2024-08-18 RX ORDER — MORPHINE SULFATE 1 MG/ML
INJECTION, SOLUTION EPIDURAL; INTRATHECAL; INTRAVENOUS AS NEEDED
Status: DISCONTINUED | OUTPATIENT
Start: 2024-08-18 | End: 2024-08-18

## 2024-08-18 RX ORDER — OXYTOCIN/0.9 % SODIUM CHLORIDE 30/500 ML
60 PLASTIC BAG, INJECTION (ML) INTRAVENOUS ONCE AS NEEDED
Status: DISCONTINUED | OUTPATIENT
Start: 2024-08-18 | End: 2024-08-20 | Stop reason: HOSPADM

## 2024-08-18 RX ORDER — BISACODYL 10 MG/1
10 SUPPOSITORY RECTAL DAILY PRN
Status: DISCONTINUED | OUTPATIENT
Start: 2024-08-18 | End: 2024-08-20 | Stop reason: HOSPADM

## 2024-08-18 RX ORDER — ENOXAPARIN SODIUM 100 MG/ML
40 INJECTION SUBCUTANEOUS EVERY 24 HOURS
Status: DISCONTINUED | OUTPATIENT
Start: 2024-08-18 | End: 2024-08-20 | Stop reason: HOSPADM

## 2024-08-18 RX ORDER — PANTOPRAZOLE SODIUM 40 MG/1
40 TABLET, DELAYED RELEASE ORAL
Status: DISCONTINUED | OUTPATIENT
Start: 2024-08-18 | End: 2024-08-18

## 2024-08-18 RX ORDER — ONDANSETRON HYDROCHLORIDE 2 MG/ML
4 INJECTION, SOLUTION INTRAVENOUS EVERY 6 HOURS PRN
Status: DISCONTINUED | OUTPATIENT
Start: 2024-08-18 | End: 2024-08-20 | Stop reason: HOSPADM

## 2024-08-18 RX ORDER — NIFEDIPINE 10 MG/1
10 CAPSULE ORAL ONCE AS NEEDED
Status: DISCONTINUED | OUTPATIENT
Start: 2024-08-18 | End: 2024-08-20 | Stop reason: HOSPADM

## 2024-08-18 RX ORDER — IBUPROFEN 600 MG/1
600 TABLET ORAL EVERY 6 HOURS
Status: DISCONTINUED | OUTPATIENT
Start: 2024-08-19 | End: 2024-08-20 | Stop reason: HOSPADM

## 2024-08-18 RX ORDER — NALOXONE HYDROCHLORIDE 0.4 MG/ML
0.1 INJECTION, SOLUTION INTRAMUSCULAR; INTRAVENOUS; SUBCUTANEOUS EVERY 5 MIN PRN
Status: DISCONTINUED | OUTPATIENT
Start: 2024-08-18 | End: 2024-08-20 | Stop reason: HOSPADM

## 2024-08-18 RX ORDER — LABETALOL HYDROCHLORIDE 5 MG/ML
20 INJECTION, SOLUTION INTRAVENOUS ONCE AS NEEDED
Status: DISCONTINUED | OUTPATIENT
Start: 2024-08-18 | End: 2024-08-20 | Stop reason: HOSPADM

## 2024-08-18 RX ORDER — OXYCODONE HYDROCHLORIDE 5 MG/1
5 TABLET ORAL EVERY 4 HOURS PRN
Status: DISCONTINUED | OUTPATIENT
Start: 2024-08-19 | End: 2024-08-20 | Stop reason: HOSPADM

## 2024-08-18 RX ORDER — LIDOCAINE 560 MG/1
1 PATCH PERCUTANEOUS; TOPICAL; TRANSDERMAL
Status: DISCONTINUED | OUTPATIENT
Start: 2024-08-18 | End: 2024-08-20 | Stop reason: HOSPADM

## 2024-08-18 RX ORDER — MISOPROSTOL 200 UG/1
800 TABLET ORAL ONCE AS NEEDED
Status: DISCONTINUED | OUTPATIENT
Start: 2024-08-18 | End: 2024-08-20 | Stop reason: HOSPADM

## 2024-08-18 RX ORDER — KETOROLAC TROMETHAMINE 30 MG/ML
30 INJECTION, SOLUTION INTRAMUSCULAR; INTRAVENOUS EVERY 6 HOURS
Status: COMPLETED | OUTPATIENT
Start: 2024-08-18 | End: 2024-08-19

## 2024-08-18 RX ORDER — HYDRALAZINE HYDROCHLORIDE 20 MG/ML
5 INJECTION INTRAMUSCULAR; INTRAVENOUS ONCE AS NEEDED
Status: DISCONTINUED | OUTPATIENT
Start: 2024-08-18 | End: 2024-08-20 | Stop reason: HOSPADM

## 2024-08-18 RX ORDER — SIMETHICONE 80 MG
80 TABLET,CHEWABLE ORAL 4 TIMES DAILY PRN
Status: DISCONTINUED | OUTPATIENT
Start: 2024-08-18 | End: 2024-08-20 | Stop reason: HOSPADM

## 2024-08-18 RX ORDER — FAMOTIDINE 10 MG/ML
INJECTION INTRAVENOUS AS NEEDED
Status: DISCONTINUED | OUTPATIENT
Start: 2024-08-18 | End: 2024-08-18

## 2024-08-18 RX ORDER — BUPIVACAINE HYDROCHLORIDE 7.5 MG/ML
INJECTION, SOLUTION EPIDURAL; RETROBULBAR AS NEEDED
Status: DISCONTINUED | OUTPATIENT
Start: 2024-08-18 | End: 2024-08-18

## 2024-08-18 RX ORDER — CARBOPROST TROMETHAMINE 250 UG/ML
250 INJECTION, SOLUTION INTRAMUSCULAR ONCE AS NEEDED
Status: DISCONTINUED | OUTPATIENT
Start: 2024-08-18 | End: 2024-08-20 | Stop reason: HOSPADM

## 2024-08-18 RX ADMIN — MORPHINE SULFATE 0.25 MG: 1 INJECTION, SOLUTION EPIDURAL; INTRATHECAL; INTRAVENOUS at 06:39

## 2024-08-18 RX ADMIN — BUPIVACAINE HYDROCHLORIDE 2 ML: 7.5 INJECTION, SOLUTION EPIDURAL; RETROBULBAR at 05:49

## 2024-08-18 RX ADMIN — ONDANSETRON 4 MG: 2 INJECTION, SOLUTION INTRAMUSCULAR; INTRAVENOUS at 05:34

## 2024-08-18 RX ADMIN — MORPHINE SULFATE 0.25 MG: 1 INJECTION, SOLUTION EPIDURAL; INTRATHECAL; INTRAVENOUS at 05:49

## 2024-08-18 RX ADMIN — METOCLOPRAMIDE 10 MG: 5 INJECTION, SOLUTION INTRAMUSCULAR; INTRAVENOUS at 05:40

## 2024-08-18 RX ADMIN — KETOROLAC TROMETHAMINE 30 MG: 30 INJECTION, SOLUTION INTRAMUSCULAR at 06:38

## 2024-08-18 RX ADMIN — OXYTOCIN 600 MILLI-UNITS/MIN: 10 INJECTION, SOLUTION INTRAMUSCULAR; INTRAVENOUS at 05:58

## 2024-08-18 RX ADMIN — FAMOTIDINE 20 MG: 10 INJECTION, SOLUTION INTRAVENOUS at 05:37

## 2024-08-18 RX ADMIN — AZITHROMYCIN MONOHYDRATE 500 MG: 500 INJECTION, POWDER, LYOPHILIZED, FOR SOLUTION INTRAVENOUS at 05:53

## 2024-08-18 RX ADMIN — CEFAZOLIN 2 G: 330 INJECTION, POWDER, FOR SOLUTION INTRAMUSCULAR; INTRAVENOUS at 05:35

## 2024-08-18 SDOH — HEALTH STABILITY: MENTAL HEALTH: CURRENT SMOKER: 0

## 2024-08-18 ASSESSMENT — PAIN SCALES - GENERAL
PAIN_LEVEL: 2
PAINLEVEL_OUTOF10: 2
PAINLEVEL_OUTOF10: 0 - NO PAIN

## 2024-08-18 NOTE — SIGNIFICANT EVENT
Called for C/S. 36week failure to progress. Baby to warmer, dried and stimulated. Suctioned with bulb syringe. Slight retractions noted. Sat 93-97% on RA. Will continue to monitor.

## 2024-08-18 NOTE — PROGRESS NOTES
Q 3  min   ctxs    fhts  cat  1  afeb   cx  7/90/-1  on   pitocin  now   with  good   ctx   pattern

## 2024-08-18 NOTE — LACTATION NOTE
This note was copied from a baby's chart.  Lactation Consultant Note  Lactation Consultation  Reason for Consult: Follow-up assessment  Consultant Name: Chris Oliver    Maternal Information  Has mother  before?: No  Infant to breast within first 2 hours of birth?: Yes  Exclusive Pump and Bottle Feed: No    Maternal Assessment  Breast Assessment: Medium, Symmetrical, Breast changes observed in pregnancy, Soft  Nipple Assessment: Intact, Erect with stimulation  Alterations in Nipple Condition:  (mother denies pain)  [place] Assessment: Normal    Infant Assessment  Infant Behavior: Sleepy  Infant Assessment: Premature (36.1 week infant, approximately 5 HOL, 1 void and 1 stool since birth)    Feeding Assessment  Nutrition Source: Breastmilk  Feeding Method: Nursing at the breast  Feeding Position: Skin to skin, Infant not tucked close and facing mother, Baby's head too high over breast, Breast sandwich, Cross - cradle, Football/seated, Both sides, Nipple to nose, Mother needs assistance with latch/positioning, Misalignment of baby's head, trunk, and hips  Suck/Feeding: Unsustained, Tactile stimulation needed  Latch Assessment: Reluctant    LATCH TOOL  Latch: Repeated attempts, hold nipple in mouth, stimulate to suck  Audible Swallowing: None  Type of Nipple: Everted (After stimulation)  Comfort (Breast/Nipple): Soft/non-tender  Hold (Positioning): Minimal assist, teach one side, mother does other, staff holds  LATCH Score: 6    Breast Pump  Pump: Hospital grade electric pump  Duration: Initiate phase  Breast Shield Size and Type: 24 mm    Patient Follow-up  Inpatient Lactation Follow-up Needed : Yes  Outpatient Lactation Follow-up: Recommended    Other OB Lactation Documentation  Maternal Risk Factors:  delivery  Infant Risk Factors: Prematurity <37 weeks    Recommendations/Summary  1110: 29 year old  breastfeeding mother and infant. Met with parents for routine lactation consult to assess  breastfeeding progress, to address any questions and/or concerns and to offer lactation assistance, support and education as needed and desired. Verbalizes goal of breastfeeding this infant for as long as possible. Reports positive breast changes during pregnancy. Denies history of breast or nipple surgery.     Infants blood sugars have been stable and first feeding went very well per mother and bedside RN. Mother has infant down to the diaper and is attempting to latch infant. Reviewed deep latch techniques, breast shaping, holding infant belly to belly and nose to nipple. Attempts were made to latch infant in football hold and cross cradle hold on both breasts. Infant reluctant to suck. LC had infant suck on gloved finger. Infant would intermittently suck but not consistently. Infant would open mouth and hold nipple in mouth. Encouraged mother to hand express breast milk to entice infant. Milk easily expressed. Infant still reluctant at this time. After many unsuccessful attempts, mother is requesting to try again in a little while to feed infant. LC agreed and encouraged mother to do skin to skin.     Breastfeeding education and support provided throughout consult. Parents provided with the opportunity to ask questions. All questions answered. See education flow sheet for detailed list of education topics covered. Reviewed importance of frequent skin to skin contact, waking techniques, infant stomach capacity, value of colostrum feeds and typical  feeding behaviors in the first 24 hours. Encouraged frequent skin to skin and nursing with cues and at least 8 times in the first 24 hours. Reviewed signs of adequate intake/output. Parents deny any further questions or concerns at this time. Has a personal breast pump for home use. Offered ongoing breastfeeding assistance, support and education as needed and desired.    1220: Attempts were again made to latch infant on both breasts in football hold and cross  cradle. Infant still reluctant despite rousing techniques and hand expression. After many attempts, mother requesting to pump and offer expressed breast milk. [employer] breast pump brought to the bedside. Initiate phase reviewed. Reviewed proper flange sizing, cleaning of pump parts and breast milk storage guidelines.     Mother expressed 0.1mls which was finger fed back to infant. Infant content and with mother.    All questions and concerns answered. Offered ongoing breastfeeding assistance and education as needed and desired.

## 2024-08-18 NOTE — ANESTHESIA POSTPROCEDURE EVALUATION
Patient: Kristen Hosler    Procedure Summary       Date: 24 Room / Location: [place] [address] OB    Anesthesia Start: 530 Anesthesia Stop: 650    Procedure: OBGYN Delivery  Section Diagnosis: (Failure to Progress)    Surgeons: Deonte Martinez MD Responsible Provider: ROSHAN Cramer    Anesthesia Type: spinal ASA Status: 2            Anesthesia Type: spinal    Vitals Value Taken Time   BP  24 0652   Temp  24 0652   Pulse  24 0652   Resp  24 0652   SpO2  24 0652       Anesthesia Post Evaluation    Patient location during evaluation: bedside  Patient participation: complete - patient participated  Level of consciousness: awake and alert  Pain score: 2  Pain management: adequate  Multimodal analgesia pain management approach  Airway patency: patent  Two or more strategies used to mitigate risk of obstructive sleep apnea  Cardiovascular status: acceptable  Respiratory status: acceptable  Hydration status: acceptable  Postoperative Nausea and Vomiting: none        There were no known notable events for this encounter.

## 2024-08-18 NOTE — L&D DELIVERY NOTE
OB Delivery Note  2024  Kristen Hosler  29 y.o.   , Low Transverse       Gestational Age: 36w1d  /Para:   Quantitative Blood Loss: Admission to Discharge: 0 mL (2024 10:00 AM - 2024  7:15 AM)    Hosler, KristenBoy [[id number]]      Labor Events    Rupture date/time: 2024 2200  Rupture type: Spontaneous  Fluid color: Clear  Fluid odor: None  Labor type: Spontaneous Onset of Labor  Labor allowed to proceed with plans for an attempted vaginal birth?: Yes  Augmentation: Oxytocin  Augmentation indications: Ineffective Contraction Pattern  Complications: Fetal Intolerance       Placenta    Placenta delivery date/time: 2024 0600  Placenta removal: Manual removal  Placenta appearance: Intact       Cord    Vessels: 3 vessels  Complications: Nuchal, Prolapsed  Nuchal intervention: reduced  Nuchal cord description: loose nuchal cord  Number of loops: 1  Delayed cord clamping?: Yes  Gases sent?: No  Cord comments: nuchal   x 1   reduced   and   loop prolapsed  alongside head       Anesthesia    Method: Spinal        Delivery    Birth date/time: 2023 05:58:00  Delivery type: , Low Transverse   categorization: primary   priority: urgent  Indications for : Fetal Intolerance of Labor  Incision type: low transverse  Complications: Fetal Intolerance       Resuscitation    Method: Tactile stimulation, Suctioning       Apgars    Living status: Living  Apgar Component Scores:  1 min.:  5 min.:  10 min.:  15 min.:  20 min.:    Skin color:  1  1       Heart rate:  2  2       Reflex irritability:  2  2       Muscle tone:  2  2       Respiratory effort:  2  2       Total:  9  9       Apgars assigned by: MEETA ADHIKARI RN       Delivery Providers    Delivering clinician: Deonte Martinez MD   Provider Role    Mimi Farnsworth RN Delivery Nurse    Meeta Adhikari RN Nursery Nurse     Resident                     Deonte Martinez MD

## 2024-08-18 NOTE — DISCHARGE SUMMARY
Discharge Summary    Admission Date: 2024  Discharge Date:     Discharge Diagnosis  Normal labor and delivery ([facility])    Hospital Course  Delivery Date: 2024 5:58 AM  Delivery type: , Low Transverse   GA at delivery: 36w1d  Outcome: Living  Anesthesia during delivery: Spinal  Intrapartum complications: Fetal Intolerance  Feeding method: Breastfeeding Status: Unknown     Procedures:  c/s  Contraception at discharge: none      Pertinent Physical Exam At Time of Discharge    Abd   soft   dressing   dry     Last Vitals:  Temp Pulse Resp BP MAP Pulse Ox   36.8 °C (98.2 °F) 68 18 96/53 69 (!) 87 %     Discharge Meds     Your medication list        CONTINUE taking these medications        Instructions Last Dose Given Next Dose Due   ferrous sulfate (325 mg ferrous sulfate) tablet      Take 1 tablet by mouth once daily with breakfast.       omeprazole 40 mg DR capsule  Commonly known as: PriLOSEC                     Complications Requiring Follow-Up  Rv   office   4  weeks      Test Results Pending At Discharge  Pending Labs       No current pending labs.            Outpatient Follow-Up  No future appointments.    I spent 10 minutes in the professional and overall care of this patient.      Deonte Martinez MD

## 2024-08-18 NOTE — OP NOTE
Preop 36 weeks  prolonged  rom , arrest  of   active   phase, recurrent   deep variable  decels ,  Postop  same , nuchal  cord  , prolapse of cord along  head  , procedure primary   ltc/s ebl  400 ml  ,  live  male   with   spont   resp  cry  and  good   muscle tone  , procedure    The patient was taken to the OR where spinal  was given. The patient was positioned supine on the operating table, carlson catheter and SCD's were placed, and the patient was prepped and draped in the usual sterile fashion. Adequate anesthesia was confirmed prior to incision. A Pfannenstiel incision was made and carried down to the fascia, which was scored. The fascial incision was extended bilaterally using Chan scissors. The fascia was tented off the rectus muscle using Kocher clamps and dissected off the muscle sharply and bluntly. The rectus muscle was  in the midline and the peritoneum entered bluntly. The peritoneal entry was extended bluntly and sharply and Brown and Bladder Blade retractors placed in the abdomen. The uterus was identified A transverse incision was then made in the lower uterine segment. The infant was delivered atraumatically from the oa position. vtx presentation.and   nuchal   cord  reduced .   Also   cord  prolapse next  to his head .The infant had   good   tone,  The cord was then clamped and cut and the infant handed off to waiting staff. The uterus was exteriorized.and the placenta expressed. The uterus cleared of all blood, clot, and debris. The uterine incision was closed in two running layers of 0-Vicryl suture. The 1st   locking . The posterior cul-de-sac was suctioned of all blood, clot, and debris. The uterine incision was again visualized and noted to be hemostatic. The uterus was then returned to the pelvis. The pelvis was irrigated using two sloppy wet sponges and suctioned of all blood, clot, and debris. The incision was again inspected and hemostasis noted. The peritoneum was closed  using 3-0 Vicryl in a running fashion.rectus  bellies approx  with   interrupted 3-0 vicryl The fascia was tented off the rectus muscle and found to be hemostatic posteriorly. The fascia was closed using 0-Vicryl in a running fashion. The subcutaneous tissue was irrigated with a sloppy wet sponge and optimal hemostasis was achieved with the Bovie.  The skin was closed with staples. The patient was moved back to her L&D bed and taken back to her L&D room in good condition. All counts were correct.

## 2024-08-18 NOTE — PROGRESS NOTES
Q  2-3   min   ctxs    fhts   with  variable   decels   to  70-90 cx 9/90/0  plan   if   persistent   deep  variables   will   do  c/s

## 2024-08-18 NOTE — PROGRESS NOTES
Late   entry  , pt  was   having   deep   variable   decels   to 60-80 and   losing   variability, still   9  cm  so  pt   taken   back   for   c/s   , fhts   in   or were   120s  so   spinal   placed   for  c/s

## 2024-08-18 NOTE — ANESTHESIA PROCEDURE NOTES
Spinal Block    Patient location during procedure: OB  Start time: 8/18/2024 5:40 AM  End time: 8/18/2024 5:49 AM  Reason for block: labor analgesia  Staffing  Performed: LONI   Authorized by: ROSHAN Cramer    Performed by: ROSHAN Cramer    Preanesthetic Checklist  Completed: patient identified, IV checked, risks and benefits discussed, surgical consent, monitors and equipment checked, pre-op evaluation, timeout performed and sterile techniques followed  Block Timeout  RN/Licensed healthcare professional reads aloud to the Anesthesia provider and entire team: Patient identity, procedure with side and site, patient position, and as applicable the availability of implants/special equipment/special requirements.  Patient on coagulant treatment: yes  Timeout performed at: 8/18/2024 5:30 AM  Spinal Block  Patient position: sitting  Prep: Betadine  Sterility prep: cap, drape, gloves, mask and hand hygiene  Sedation level: no sedation  Patient monitoring: heart rate, ETCO2, EKG, continuous pulse oximetry and blood pressure  Approach: midline  Vertebral space: L3-4  Injection technique: single-shot  Needle  Needle type: pencil-point   Needle gauge: 24 G  Needle length: 3.5 in  Free flowing CSF: yes    Assessment  Sensory level: T4  Block outcome: pain improved  Procedure assessment: patient tolerated procedure well with no immediate complications

## 2024-08-18 NOTE — ANESTHESIA PREPROCEDURE EVALUATION
Patient: Kristen Hosler    Evaluation Method: In-person visit    Procedure Information       Anesthesia Start Date/Time: 24 0530    Procedure: OBGYN Delivery  Section    Location: [facility] OB    Surgeons: Deonte Martinez MD            Relevant Problems   Anesthesia (within normal limits)      Cardiac (within normal limits)      Pulmonary (within normal limits)      Neuro (within normal limits)      GI (within normal limits)      /Renal (within normal limits)      Liver (within normal limits)      Endocrine (within normal limits)      Hematology (within normal limits)      Musculoskeletal (within normal limits)      HEENT (within normal limits)      ID (within normal limits)      Skin (within normal limits)      GYN   (+) 35 weeks gestation of pregnancy ([facility])       Clinical information reviewed:    Allergies  Meds  Problems              NPO Detail:  No data recorded     OB/GYN     Physical Exam    Airway  Mallampati: II  TM distance: >3 FB  Neck ROM: full     Cardiovascular - normal exam  Rhythm: regular  Rate: normal     Dental - normal exam     Pulmonary - normal exam  Breath sounds clear to auscultation     Abdominal - normal exam  Abdomen: soft  Bowel sounds: normal           Anesthesia Plan    History of general anesthesia?: no  History of complications of general anesthesia?: no    ASA 2     spinal     The patient is not a current smoker.  Patient was not previously instructed to abstain from smoking on day of procedure.  Patient did not smoke on day of procedure.    Anesthetic plan and risks discussed with patient.  Use of blood products discussed with patient who consented to blood products.    Plan discussed with CRNA and attending.

## 2024-08-19 LAB
ERYTHROCYTE [DISTWIDTH] IN BLOOD BY AUTOMATED COUNT: 13.2 % (ref 11.5–14.5)
HCT VFR BLD AUTO: 29.2 % (ref 36–46)
HGB BLD-MCNC: 9.4 G/DL (ref 12–16)
MCH RBC QN AUTO: 28.6 PG (ref 26–34)
MCHC RBC AUTO-ENTMCNC: 32.2 G/DL (ref 32–36)
MCV RBC AUTO: 89 FL (ref 80–100)
NRBC BLD-RTO: 0 /100 WBCS (ref 0–0)
PLATELET # BLD AUTO: 301 X10*3/UL (ref 150–450)
RBC # BLD AUTO: 3.29 X10*6/UL (ref 4–5.2)
WBC # BLD AUTO: 18.7 X10*3/UL (ref 4.4–11.3)

## 2024-08-19 PROCEDURE — 2500000001 HC RX 250 WO HCPCS SELF ADMINISTERED DRUGS (ALT 637 FOR MEDICARE OP): Performed by: OBSTETRICS & GYNECOLOGY

## 2024-08-19 PROCEDURE — 85027 COMPLETE CBC AUTOMATED: CPT | Performed by: OBSTETRICS & GYNECOLOGY

## 2024-08-19 PROCEDURE — 1100000001 HC PRIVATE ROOM DAILY

## 2024-08-19 PROCEDURE — 36415 COLL VENOUS BLD VENIPUNCTURE: CPT | Performed by: OBSTETRICS & GYNECOLOGY

## 2024-08-19 PROCEDURE — 2500000004 HC RX 250 GENERAL PHARMACY W/ HCPCS (ALT 636 FOR OP/ED): Performed by: OBSTETRICS & GYNECOLOGY

## 2024-08-19 ASSESSMENT — PAIN SCALES - GENERAL
PAINLEVEL_OUTOF10: 4
PAINLEVEL_OUTOF10: 0 - NO PAIN
PAINLEVEL_OUTOF10: 0 - NO PAIN

## 2024-08-19 ASSESSMENT — PAIN DESCRIPTION - LOCATION: LOCATION: ABDOMEN

## 2024-08-19 NOTE — LACTATION NOTE
This note was copied from a baby's chart.  Lactation Consultant Note     24 1030   Lactation Consultation   Reason for Consult Follow-up assessment;Late  infant   Consultant Name KIRT Arnold RN, IBCLC   Maternal Information   Has mother  before? No   Infant to breast within first 2 hours of birth? Yes   Exclusive Pump and Bottle Feed No   Maternal Assessment   Breast Assessment Medium;Soft;Warm;Compressible;Breast changes observed in pregnancy;Symmetrical   Nipple Assessment Intact;Erect;Rounded after feeding   [place]ola Assessment Normal   Infant Assessment   Infant Behavior Sleepy;Light sleep;Suckles on and off, needs stimulation;Content after feeding   Infant Assessment   (36.1 weeks, approximately 28 HOL, 2 voids/4 stools in last 24 hours, -2.64% weight loss @23 HOL)   Feeding Assessment   Nutrition Source Breastmilk;Donor human milk   Feeding Method Nursing at the breast;Supplemental nursing system  (tube and syringe at the breast)   Feeding Position Breast sandwich;Cross - cradle;Skin to skin;Nipple to nose;Mother demonstrates good positioning;Mother needs assistance with latch/positioning   Suck/Feeding Sustained;Unsustained;Tactile stimulation needed;Supplemented breast;Nipple shield used;Content after feeding;Baby led rhythmically   Latch Assessment Moderate assistance is needed;Reluctant;Instructed on deep latch;Shallow latch;Deep latch obtained;Latch achieved after repeated attempts;Wide open mouth < 160;Comfortable with no pain;Sucking and swallowing;Sucks with long jaw movement;Lower lip turned in;Flanged lips;Chin moves in rhythmic motion;Comfortable latch   LATCH Tool   Latch 1   Audible Swallowing 1  (with supplementation)   Type of Nipple 2   Comfort (Breast/Nipple) 2   Hold (Positioning) 1   LATCH Score 7   Breast Pump   Pump Hospital grade electric pump;Double breast pumping   Frequency 5-7 times per day   Duration Initiate phase   Breast Shield Size and Type 24 mm   Volume of  Milk Production 5   Units of Volume mL per session   Patient Follow-Up   Inpatient Lactation Follow-up Needed  Yes   Outpatient Lactation Follow-up Recommended   Other OB Lactation Documentation    Maternal Risk Factors Age over 30, primiparity; delivery; delivery <37 weeks   Infant Risk Factors Prematurity <37 weeks;Poor or painful latch / restricted feedings       Recommendations/Summary  28 y/o  mother with delivery of   boy approximately 28 hours ago via primary .  requiring glucose monitoring per protocol due to gestational age. Mother states she plans to breastfeed this  for as long as possible. Mother reports +breast changes during pregnancy and denies history of breast surgery. Mother has her personal breast pump with her at the hospital.     LC to bedside per RN request and to assess breastfeeding progress thus far. RN states  is still sleepy for feeds and is difficult to keep stimulated long enough to take more than 2 mLs of DHM via finger feeding.  currently asleep on mother's chest. Mother states  fed well for first feed after delivery but has since been very sleepy for feeds and requiring supplementation at the breast via SNS. Mother states concern that  is not nursing well. LC reviewed normal feeding patterns of the   and importance of waking techniques and pumping after feeds to assist with establishing mother's milk supply. Mother states understanding. Mother states she has not pumped since last night and states she pumped several drops that were finger fed back to . LC provided encouragement and reviewed the importance of stimulation vs volume expressed. Mother states understanding. Mother denies nipple pain or breakdown and denies feeling bautista today. Mother states  is due to feed at this time.  in a deep sleep. LC offered to assist with waking techniques, mother agreeable. SOLITARIO  took  to [facility]. Diaper changed.  more alert and placed back with mother. Mother attempting to latch  to the right breast in cross cradle with breast shaping, needing only minimal assistance with positioning.  reluctant and fussy and pushing away from the breast. Due to  not having many successful feeds, LC felt it necessary to use the nipple shield at this time. Mother states she has been using the medium shield that is at the bedside. Small shield also at the bedside and seems to be a more appropriate size. LC reviewed placement of nipple shield. Mother requesting LC place shield. Shield placed and mother positioning  back to the breast. LC reviewed importance of aligning  belly to belly and nipple to nose as well as importance of breast shaping. LC offered to demonstrate breast shaping as mother is having some difficulty. Mother agreeable and LC demonstrated with mother's permission. Mother able to return demonstrate and pleased when  obtained a latch. [place] willing to latch but unwilling to suck. LC with DHM at bedside. LC prepped DHM with tube and syringe and demonstrated placement of tube into the corner of 's mouth. LC placed minimal pressure initially on the plunger of the syringe and  continued sucking without assistance, obtaining a total of 9 mLs of DHM at the breast.  content after supplementation and unwilling to continue sucking, even with shield present. LC encouraged mother to place  to her chest at this time to burp and continued skin to skin. Parents very pleased with this feed. LC reviewed importance of pumping after daytime feeds and poor nighttime feeds as  is not sucking long enough to stimulate mother's supply. Mother agreeable to begin pumping. Father taking  for additional skin to skin while mother pumps. Pump parts assembled for mother and initiate phase reviewed. Mother pleased after  pumping session complete as she was able to express 5 mLs of colostrum. LC reviewed breast milk storage as well as washing and storage of pump parts. LC reviewed importance of always offering mother's breast milk first and then bridging with DHM and not mixing DHM with breast milk. Mother states understanding.  still content at this time while skin to skin with father.     Education reviewed at this time. LC reviewed normal feeding patterns and monitoring of the  . Reviewed milk production, normal  feeding patterns in the first 24-48 hours and 's stomach capacity. Reviewed feeding cues, waking techniques and signs to know  is eating enough. Reviewed signs of a deep and comfortable latch and adequate output. Reviewed frequency of feeds and importance of feeding  every 3 hours from the beginning of the previous feed or earlier with feeding cues. Discussed importance of skin to skin. Mother states understanding. Importance of pumping after daytime feeds and poor nighttime feeds reviewed as well as breast milk storage and washing and storage of pump parts. Mother states understanding. LC to return to room for next feed and assist parents with supplementation at the breast. Offered ongoing assistance with breastfeeding. Mother denies further questions or concerns at this time.

## 2024-08-19 NOTE — CARE PLAN
The patient's goals for the shift include rest    The clinical goals for the shift include better consistency with breast feeding, good pain control with switch to ibuprofen

## 2024-08-19 NOTE — LACTATION NOTE
This note was copied from a baby's chart.  Lactation Consultant Note  Lactation Consultation  Reason for Consult: Follow-up assessment, Late  infant  Consultant Name: KIRT Arnold RN, IBCLC    Maternal Information       Maternal Assessment       Infant Assessment  Infant Behavior: Sleepy, Feeding cues observed, Content after feeding    Feeding Assessment  Nutrition Source: Breastmilk, Donor human milk  Feeding Method: Nursing at the breast, Feeding expressed breastmilk, Supplemental nursing system (tube and syringe)  Feeding Position: Breast sandwich, Cross - cradle, Skin to skin, Nipple to nose, Mother demonstrates good positioning, One sided nursing  Suck/Feeding: Sustained, Coordinated suck/swallow/breathe, Tactile stimulation needed, Audible swallowing with stimulaton, Supplemented breast, Nipple shield used, Content after feeding  Latch Assessment: Minimal assistance is needed, Instructed on deep latch, Eagerly grasped on to latch, Shallow latch, Deep latch obtained, Latch achieved after repeated attempts, Wide open mouth < 160, Comfortable with no pain, Sucking and swallowing, Lower lip turned in, Flanged lips, Chin moves in rhythmic motion, Comfortable latch, Reluctant, Sucks with long jaw movement    LATCH TOOL  Latch: Repeated attempts, hold nipple in mouth, stimulate to suck  Audible Swallowing: A few with stimulation  Type of Nipple: Everted (After stimulation)  Comfort (Breast/Nipple): Soft/non-tender  Hold (Positioning): No assist from staff, mother able to position/hold infant  LATCH Score: 8    Breast Pump       Other OB Lactation Tools       Patient Follow-up  Inpatient Lactation Follow-up Needed : Yes    Other OB Lactation Documentation       Recommendations/Summary  LC to bedside to assist with feed. Mother currently attempting to latch  to the left bare breast in cross cradle with breast shaping. Mother demonstrating good positioning but states she is having difficulty getting   to open at a wide angle. Mother also trying to stay covered as visitors are at the bedside. LC offered to assist with latching  and then have mother take over. Mother receptive and with mother's permission, LC positioned  to the breast.  able to obtain a deep latch but unable to sustain for more than 1-2 sucks. After several attempts, LC encouraged mother to place the nipple shield. Mother independently placing small nipple shield and then repositioning  to the breast. After several attempts, a latch was obtained, though  is opening at a sub-optimal angle. [place] suckling but not with long jaw movements, even with stimulation. Mother states latch is comfortable. Mother did briefly unlatch  in an attempt to obtain a deeper latch and colostrum noted in the shield. LC prepped mother's expressed colostrum from previous pumping session and father able to place tube into corner of 's mouth with minimal guidance from LC. Father providing minimal pressure to syringe and after stimulation,  began sucking and moving plunger of the syringe, obtaining a total of 4 mLs of colostrum. Mother placing  to her chest to burp before repositioning  back to the breast to latch.  latched and father independently preparing DHM and placing tube. [place] more eager to begin sucking and quickly obtained 5 mLs of DHM. [place] continued sucking with long jaw movements without supplementation for approximately 1-2 minutes before falling asleep. Mother placing  to her chest to burp. [place] content after feed and mother states she will begin pumping in approximately 10 minutes after her visitors leave. Parents pleased with 's progress. Offered ongoing assistance with breastfeeding. Mother denies further questions or concerns at this time.

## 2024-08-19 NOTE — PROGRESS NOTES
Postpartum Progress Note    Assessment/Plan   Kristen Hosler is a 29 y.o., , who delivered at 36w1d gestation and is now postpartum day 1.  -doing well, denies complaints  -continue supportive pp and PO care    Assessment & Plan    Pregnancy Problems (from 24 to present)       Problem Noted Resolved    35 weeks gestation of pregnancy ([facility]) 2024 by Karine Mcdonough MD 2024 by Deonte Martinez MD    Priority:  Medium            Hospital course: no complications   section delivery  Patient is currently breastfeedingThe patient's blood type is O POS. The baby's blood type is A POS. Rhogam is not indicated.    Subjective   Her pain is well controlled with current medications  She is passing flatus  She is ambulating well  She is tolerating a Adult diet Regular  She reports no breast or nursing problems  She denies emotional concerns today   Her plan for contraception is none       Objective   Allergies:   Patient has no known allergies.         Last Vitals:  Temp Pulse Resp BP MAP Pulse Ox   36.6 °C (97.9 °F) 67 18 105/70 82 98 %     Vitals Min/Max Last 24 Hours:  Temp  Min: 36.6 °C (97.9 °F)  Max: 36.8 °C (98.2 °F)  Pulse  Min: 54  Max: 93  Resp  Min: 16  Max: 18  BP  Min: 93/53  Max: 128/65  MAP (mmHg)  Min: 67  Max: 86    Intake/Output:     Intake/Output Summary (Last 24 hours) at 2024 0822  Last data filed at 2024 0903  Gross per 24 hour   Intake --   Output 280 ml   Net -280 ml       Physical Exam:  Physical Exam  Constitutional:       General: She is not in acute distress.     Appearance: Normal appearance.   Pulmonary:      Effort: Pulmonary effort is normal.   Abdominal:      General: Bowel sounds are normal.      Palpations: Abdomen is soft.      Uterus firm, below U  Musculoskeletal:         General: Normal range of motion.   Neurological:      Mental Status: She is alert.   Psychiatric:         Mood and Affect: Mood normal.       Lab Data:    Results for orders  placed or performed during the hospital encounter of 08/17/24 (from the past 24 hour(s))   CBC   Result Value Ref Range    WBC 18.7 (H) 4.4 - 11.3 x10*3/uL    nRBC 0.0 0.0 - 0.0 /100 WBCs    RBC 3.29 (L) 4.00 - 5.20 x10*6/uL    Hemoglobin 9.4 (L) 12.0 - 16.0 g/dL    Hematocrit 29.2 (L) 36.0 - 46.0 %    MCV 89 80 - 100 fL    MCH 28.6 26.0 - 34.0 pg    MCHC 32.2 32.0 - 36.0 g/dL    RDW 13.2 11.5 - 14.5 %    Platelets 301 150 - 450 x10*3/uL

## 2024-08-19 NOTE — CARE PLAN
The patient's goals for the shift include rest    The clinical goals for the shift include breastfeeding and pumping    Over the shift, the patient did not make progress toward the following goals. Barriers to progression include lack of sleep and trying to breastfeed. Recommendations to address these barriers include lack of sleep.

## 2024-08-20 ENCOUNTER — LACTATION ENCOUNTER (OUTPATIENT)
Dept: NURSERY | Facility: HOSPITAL | Age: 29
End: 2024-08-20

## 2024-08-20 VITALS
HEIGHT: 69 IN | DIASTOLIC BLOOD PRESSURE: 77 MMHG | WEIGHT: 207.45 LBS | SYSTOLIC BLOOD PRESSURE: 128 MMHG | TEMPERATURE: 98.2 F | RESPIRATION RATE: 18 BRPM | OXYGEN SATURATION: 9 % | HEART RATE: 75 BPM | BODY MASS INDEX: 30.73 KG/M2

## 2024-08-20 PROCEDURE — 2500000001 HC RX 250 WO HCPCS SELF ADMINISTERED DRUGS (ALT 637 FOR MEDICARE OP): Performed by: OBSTETRICS & GYNECOLOGY

## 2024-08-20 ASSESSMENT — PAIN SCALES - GENERAL
PAINLEVEL_OUTOF10: 4
PAINLEVEL_OUTOF10: 3

## 2024-08-20 ASSESSMENT — PAIN DESCRIPTION - DESCRIPTORS: DESCRIPTORS: SORE

## 2024-08-20 NOTE — CARE PLAN
The patient's goals for the shift include rest    The clinical goals for the shift include establish  feeding plan with minimal assitance    Over the shift, the patient did make progress toward the following goals. Barriers to progression include n/a. Recommendations to address these barriers include n/a.      Problem: Postpartum  Goal: Experiences normal postpartum course  Outcome: Progressing  Goal: Appropriate maternal -  bonding  Outcome: Progressing  Goal: Establish and maintain infant feeding pattern for adequate nutrition  Outcome: Progressing  Goal: Incisions, wounds, or drain sites healing without S/S of infection  Outcome: Progressing  Goal: No s/sx infection  Outcome: Progressing  Goal: No s/sx of hemorrhage  Outcome: Progressing  Goal: Minimal s/sx of HDP and BP<160/110  Outcome: Progressing     Problem:  Recovery Care  Goal: Verbalizes understanding of post-op instructions  Outcome: Progressing  Goal: Manages discomfort  Outcome: Progressing  Goal: Dressing intact until removed with any drainage marked  Outcome: Progressing  Goal: Patient vital signs are stable  Outcome: Progressing  Goal: Fundus firm at midline  Outcome: Progressing     Problem: Discharge Planning  Goal: Discharge to home or other facility with appropriate resources  Outcome: Progressing

## 2024-08-20 NOTE — LACTATION NOTE
This note was copied from a baby's chart.  Lactation Consultant Note  Lactation Consultation  Reason for Consult: Follow-up assessment  Consultant Name: SHREYAS Elliott    Maternal Information  Has mother  before?: No  Infant to breast within first 2 hours of birth?: Yes  Exclusive Pump and Bottle Feed: No    Maternal Assessment  Breast Assessment: Medium, Warm, Soft, Compressible  Nipple Assessment: Intact, Erect with stimulation, Sore  Are[place] Assessment: Normal    Infant Assessment  Infant Behavior: Quiet alert, Rooting response, Sucking    Feeding Assessment  Nutrition Source: Breastmilk, Donor human milk  Feeding Method: Nursing at the breast, Supplemental nursing system  Feeding Position: Cross - cradle, Nipple to nose, Mother demonstrates good positioning, Skin to skin, Breast sandwich  Suck/Feeding: Sustained, Coordinated suck/swallow/breathe, Baby led rhythmically, Audible swallowing  Latch Assessment: Minimal assistance is needed, Instructed on deep latch, Bursts of sucking, swallowing, and rest    LATCH TOOL  Latch: Grasps breast, tongue down, lips flanged, rhythmic sucking  Audible Swallowing: A few with stimulation  Type of Nipple: Everted (After stimulation)  Comfort (Breast/Nipple): Filling, red/small blisters/bruises, mild/moderate discomfort  Hold (Positioning): No assist from staff, mother able to position/hold infant  LATCH Score: 8    Breast Pump  Pump: Hospital grade electric pump  Frequency: 5-7 times per day  Duration: 15-20 minutes per session  Breast Shield Size and Type: 24 mm    Other OB Lactation Tools       Patient Follow-up  Inpatient Lactation Follow-up Needed : Yes  Outpatient Lactation Follow-up: Recommended    Other OB Lactation Documentation  Maternal Risk Factors:  delivery  Infant Risk Factors: Prematurity <37 weeks    Recommendations/Summary  LC in room to be available for questions or concerns during feeding. Mother had infant latched independently to left  breast in cross cradle hold. Infant was actively sucking at breast with nipple shield in place. Mother states that they supplemented with 3ml of mothers milk at breast via sns. Mother inquiring if they should give a bit more supplementation with donor milk. LC asked mother if she noticed milk in the shield when baby pulls off the breast. Mother took infant off the breast at that time and no milk was noted in shield. Donor milk retrieved for extra volume at this time. When LC returned from getting the donor milk mother had latched infant to bare breast. Infant actively sucking at bare breast and mother notes uterine contractions at this time. Infant remained active at the breast for 25 minutes. Mother report latch as a gentle tug. Infant had top lip flanged, but difficult to visualize bottom lip. Mother displays good breast shaping. Mother continues to pump after day time feedings and volume of pumping is beginning to increase.     Offered ongoing assistance with breastfeeding. No further questions on concerns at this time.

## 2024-08-20 NOTE — LACTATION NOTE
"This note was copied from a baby's chart.  Lactation Consultant Note  Lactation Consultation  Reason for Consult: Follow-up assessment, Late  infant ([employer] education)  Consultant Name: BELLA PRITCHETT    Maternal Information  Has mother  before?: No  Infant to breast within first 2 hours of birth?: Yes  Exclusive Pump and Bottle Feed: No  WIC Program: No    Maternal Assessment  Breast Assessment: Filling (per mother)  Nipple Assessment:  (intact per mother)  Alterations in Nipple Condition:  (denies pain or skin breakdown)    Infant Assessment  Infant Behavior: Deep sleep  Infant Assessment:  (late pre-term infant, born at 36.1 weeks via C/S delivery, approximately 52 hours old, weight loss 7.6%, above adequate output per parents)    Feeding Assessment  Nutrition Source: Breastmilk, Donor human milk  Feeding Method: Nursing at the breast, Feeding expressed breastmilk, Supplemental nursing system  Unable to assess infant feeding at this time:  (parents report infant just nursed well, mother pumped after, report infant \"turned a corner\" this morning and is doing much better, are eager for discharge)    LATCH TOOL       Breast Pump  Pump:  (has a hands free pump for home going, education provided, reviewed hands free vs double electric education per mother's request)  Frequency: 3-4 times per day  Duration: 15-20 minutes per session  Breast Shield Size and Type: 24 mm  Volume of Milk Production:  (obtained approximately 20 mls after feeding this morning)    Other OB Lactation Tools  Lactation Tools: Nipple shields (states she did not need nipple shield for feeding this morning, education/risks of use reviewed, PI sheet provided)    Patient Follow-up  Inpatient Lactation Follow-up Needed : No  Outpatient Lactation Follow-up: Scheduled  Lactation Professional - OK to Discharge: Yes    Other OB Lactation Documentation  Maternal Risk Factors:  delivery  Infant Risk Factors: Prematurity <37 " weeks    Recommendations/Summary  29 year old  breastfeeding mother and late pre-term infant preparing for discharge. Met with parents for follow-up lactation consult to assess breastfeeding progress, to address any questions and/or concerns and to offer lactation assistance, support and education as needed and desired prior to discharge. Parents are eager for discharge. Reports breastfeeding is significantly improving. States infant nursed this morning actively and well x 45 minutes with active suck and audible/visible swallowing. Mother verbalizes confidence with feeding plan. Plans to continue direct breastfeeding without shield as able. Will use shield if needed. Has PI sheet with detailed education/risks. Will pump after day time feeding and any ineffective night time feedings due to late pre-term infant status. Will re-feed EBM as needed for supplementation. Sees Pediatrician on Thursday. Has outpatient lactation appt scheduled for Friday.     Breastfeeding written and verbal discharge education reviewed throughout consult. Parents provided with the opportunity to ask questions. All questions answered. See education flow sheet for detailed list of education topics covered. Reviewed importance of frequent skin to skin contact, waking techniques, infant stomach capacity, value of breast milk feeds as well as typical  feeding patterns and behaviors in the first few weeks of life. Encouraged frequent skin to skin and nursing with cues and at least 8-12 times or more per 24 hours. Reviewed signs of adequate intake/output. Reviewed resources for lactation follow-up and support after discharge. Parents deny any further questions or concerns at this time. Verbalize confidence with breastfeeding prior to discharge. Offered ongoing breastfeeding assistance, support and education as needed and desired.

## 2024-08-20 NOTE — PROGRESS NOTES
Postpartum Progress Note    Assessment/Plan   Kristen Hosler is a 29 y.o., , who delivered at 36w1d gestation and is now postpartum day 2.  -doing well, ambulating, pain well controlled  -plan home today  Assessment & Plan    Pregnancy Problems (from 24 to present)       Problem Noted Resolved    35 weeks gestation of pregnancy ([facility]) 2024 by Karine Mcdonough MD 2024 by Deonte Martinez MD    Priority:  Medium            Hospital course: no complications   section delivery  Patient is currently breastfeedingThe patient's blood type is O POS. The baby's blood type is A POS. Rhogam is not indicated.    Subjective   Her pain is well controlled with current medications  She is passing flatus  She is ambulating well  She is tolerating a Adult diet Regular  She reports no breast or nursing problems  She denies emotional concerns today   Her plan for contraception is condoms    Objective   Allergies:   Patient has no known allergies.         Last Vitals:  Temp Pulse Resp BP MAP Pulse Ox   36.8 °C (98.2 °F) 68 14 110/62 81 99 %     Vitals Min/Max Last 24 Hours:  Temp  Min: 36.6 °C (97.9 °F)  Max: 36.8 °C (98.2 °F)  Pulse  Min: 67  Max: 71  Resp  Min: 14  Max: 18  BP  Min: 97/56  Max: 122/73  MAP (mmHg)  Min: 71  Max: 91    Intake/Output:   No intake or output data in the 24 hours ending 24 0709    Physical Exam:  Physical Exam  Constitutional:       General: She is not in acute distress.     Appearance: Normal appearance.   Pulmonary:      Effort: Pulmonary effort is normal.   Abdominal:      General: Bowel sounds are normal.      Palpations: Abdomen is soft.      Uterus firm, below U  Musculoskeletal:         General: Normal range of motion.   Neurological:      Mental Status: She is alert.   Psychiatric:         Mood and Affect: Mood normal.       Lab Data:

## 2024-08-23 ENCOUNTER — LACTATION CONSULT (OUTPATIENT)
Dept: LACTATION | Facility: HOSPITAL | Age: 29
End: 2024-08-23
Payer: COMMERCIAL

## 2024-08-23 DIAGNOSIS — O92.70 LACTATION DISORDER (HHS-HCC): Primary | ICD-10-CM

## 2024-08-23 NOTE — PROGRESS NOTES
Lactation Counseling Note    Subjective:    Kristen Hosler is a 29 y.o. patient referred for lactation counseling. She is here today due to  weight and latch check . She was referred by her  self .     OB HISTORY:   Healthcare Provider: OB/GYN Janina Celestin  Prenatal Screening: Negative  /Para: : 2  Para: 1  Weeks Gestation: 36  Mode of Delivery: Primary  section  Delivery Complications: None  Muir Information: Baby's Name: Rowan Hosler  : 24  Place of Birth: [facility]  Birth weight: 6 lb 11 oz    Objective:    BREASTFEEDING ASSESSMENT:   Physical Exam    Baby Name: Rowan Hosler  Breast Assessment: Medium, Symmetrical, Filling, Full, Soft, Warm, and Compressible  Nipple Assessment/Stage: intact and erect no pain or breakdown  [place]: Normal  Feeding Position: breast sandwich, cross - cradle, skin to skin, and mother demonstrates good positioning  Latch: minimal assistance is needed, instructed on deep latch, eagerly grasped on to latch, deep latch obtained, comfortable with no pain, sucking and swallowing, sucks with long jaw movement, lower lip turned in, comfortable latch, and frequent audible swallows  Suck/Feeding: sustained, audible swallowing, and content after feeding  Infant Behavior: quiet alert, sucking, and content after feeding  Breast Pain: Pain scale 0-10 (10 most pain): 0  Nipple Pain: Pain scale 0-10 (10 most pain): 0  Weight: Reported pediatrician visit weight: 6 lb 2 oz  Date of pediatrician weight: 24  Weight before feedin gm  Weight after feedin gm  Amount of breast milk transferred: 85 ml  Number of voids in the last 24 hours: 8  Number of stools in the last 24 hours: 8    PATIENT DISCUSSION SUMMARY:  Mother and infant seen for concerns over weight and latch check.    Mother has been feeding on demand. Mother was initially supplementing with DHM in the hospital and one or two days at home but is not longer doing supplementation as of  "yesterday. Mother's milk in and milk easily expressible.      Infant weight gain adequate.  Infant alert with moist mucous membranes.  Wet diaper in office.    Mother's nipples erect and breasts soft and infant able to sustain latch.  Infant is nursing well and rhythmically. Infant needing occasional tactile stimulation but overall is feeding very well. Infant occasionally curling lower lip but it easily everts. Mother reports latch as comfortable and deep. Infant nursed well on both breasts and mothers nipples rounded after feeding. Infant transferred 85mls and content after feeding.     Discussed to bottle feed with more upright positioning and agapito lips to help infant have wide latch when using bottle by pulling down on chin and everting upper lip when they begin offering practice bottles in 2-4 weeks.     All questions and concerns answered and addressed.       Plan:  Feed at least 8-12 times daily, both breasts for as long as infant is actively sucking and swallowing.  Use massage and compression to aid transfer.  Mother to follow up with pediatrician to assess weight in a week or two.     Will f/u with lactation as needed and with pediatrician.    Denies questions or concerns.   LACTATION PROBLEMS AND STRATEGIES:  Engorgement: Apply ice (or a bag of frozen vegetables) to breasts between feedings for comfort, Begins between the third and fifth day and lasts 12 to 48 hours, Cabbage leaf treatment no more than 2 to 3 times for 20 minutes between feedings, Call your health provider if breast remains hard and painful, Check bra for fit and support. Should not be restrictive, Check to be sure baby latching deeply to breast, Do not skip or delay feedings, Do not wait until baby cries before feeding. Watch for feeding cues - body movement, sucking, mouth open, etc, Eliminate supplements and pacifier, If baby having problems latching to breast, try \"reverse pressure\" and hand expression, Massage breast during feeding " to help drain the breast, Nurse both breasts for 15 to 20 minutes each, or express after feedings until soft, Nurse frequently (10-12 times per day), See PI -163 BF: Breast Fullness and Engorgement, Take anti-inflammatory medication such as Ibuprofen or Advil, Use breast pump only if reverse pressure and hand expression do not relieve breast engorgement, Watch for feeding cues - body movements, in or near mouth and mouth open, Wear breast shells between feedings to promote leaking, and Do reverse pressure softening (see PI -163)  Storing/thawing milk for home use: Do not microwave milk.  Express milk into a clean container, cool before combining with previously expressed milk.  Expressed milk can be kept at room temperature 4 hours.  Fresh milk separates and may appear bluish, yellowish or brownish in color.  Frozen milk may turn yellowish.  If baby receives mostly freshly expressed human milk, use plastic containers.  If freezing milk, use glass or plastic containers.  If using bottle liners, double bag to avoid tearing.  Label each container with date and time of expression.  Then use oldest first.  Milk in a deep freezer can be kept up to 6 months.  Milk in a freezer compartment of a refrigerator can be kept 2 weeks.  Milk in a self contained freezer can be kept 3-6 months.  Previously frozen milk that was thawed can be kept up to 24 hours.  Refrigerated milk can be kept up to 4 days.  Store milk in portions your baby normal drinks.  Thaw milk by placing in refrigerator overnight or place container under warm running water.  Do not microwave.  Use containers that have been washed in soapy water.  Wash hands before expressing.  PATIENT INSTRUCTION HANDOUTS GIVEN:   None at this time  LACTATION EDUCATION:  Waking Techniques and Correct Latch On

## 2024-09-20 PROCEDURE — 88175 CYTOPATH C/V AUTO FLUID REDO: CPT

## 2024-09-23 ENCOUNTER — LAB REQUISITION (OUTPATIENT)
Dept: LAB | Facility: HOSPITAL | Age: 29
End: 2024-09-23
Payer: COMMERCIAL

## 2024-09-23 DIAGNOSIS — Z11.51 ENCOUNTER FOR SCREENING FOR HUMAN PAPILLOMAVIRUS (HPV): ICD-10-CM

## 2024-10-02 LAB
CYTOLOGY CMNT CVX/VAG CYTO-IMP: NORMAL
LAB AP HPV GENOTYPE QUESTION: YES
LAB AP HPV HR: NORMAL
LABORATORY COMMENT REPORT: NORMAL
PATH REPORT.TOTAL CANCER: NORMAL

## (undated) DEVICE — APPLICATOR, PREP, CHLORAPREP, W/ORANGE TINT, 3ML

## (undated) DEVICE — DRESSING, MEPILEX BORDER, POST-OP AG, 4 X 10 IN

## (undated) DEVICE — PREP TRAY, SKIN, DRY, W/GLOVES

## (undated) DEVICE — PAD, GROUNDING, ELECTROSURGICAL, W/9 FT CABLE, POLYHESIVE II, ADULT, LF

## (undated) DEVICE — SUTURE, VICRYL 0, 36 IN, CT-1, VIOLET

## (undated) DEVICE — SUTURE, VICRYL, 4-0, 27 IN, KS, UNDYED

## (undated) DEVICE — TOWEL PACK, STERILE, 4/PACK, BLUE

## (undated) DEVICE — DRAPE PACK, CESAREAN SECTION, CUSTOM, GEAUGA

## (undated) DEVICE — KIT, MINOR, DOUBLE BASIN

## (undated) DEVICE — GLOVE, SURGEON, PREMIERPRO PI, SZ-5.5, PF, WH

## (undated) DEVICE — SPONGE, GAUZE, XRAY DECT, 16 PLY, 4 X 4, W/MASTER DMT,STERILE